# Patient Record
Sex: MALE | Race: WHITE | NOT HISPANIC OR LATINO | Employment: FULL TIME | URBAN - METROPOLITAN AREA
[De-identification: names, ages, dates, MRNs, and addresses within clinical notes are randomized per-mention and may not be internally consistent; named-entity substitution may affect disease eponyms.]

---

## 2017-06-08 DIAGNOSIS — Z20.828 EXPOSURE TO THE FLU: Primary | ICD-10-CM

## 2017-06-08 RX ORDER — OSELTAMIVIR PHOSPHATE 75 MG/1
75 CAPSULE ORAL DAILY
Qty: 10 CAPSULE | Refills: 0 | Status: SHIPPED | OUTPATIENT
Start: 2017-06-08 | End: 2018-11-02

## 2018-11-02 ENCOUNTER — OFFICE VISIT (OUTPATIENT)
Dept: INTERNAL MEDICINE | Facility: CLINIC | Age: 23
End: 2018-11-02

## 2018-11-02 ENCOUNTER — HOSPITAL ENCOUNTER (OUTPATIENT)
Dept: GENERAL RADIOLOGY | Facility: HOSPITAL | Age: 23
Discharge: HOME | End: 2018-11-02
Admitting: INTERNAL MEDICINE

## 2018-11-02 VITALS
OXYGEN SATURATION: 98 % | SYSTOLIC BLOOD PRESSURE: 122 MMHG | WEIGHT: 119.6 LBS | BODY MASS INDEX: 20.42 KG/M2 | RESPIRATION RATE: 22 BRPM | TEMPERATURE: 98.8 F | HEIGHT: 64 IN | DIASTOLIC BLOOD PRESSURE: 74 MMHG | HEART RATE: 112 BPM

## 2018-11-02 DIAGNOSIS — L08.9 RIGHT KNEE SKIN INFECTION: ICD-10-CM

## 2018-11-02 DIAGNOSIS — L20.9 ATOPIC DERMATITIS, UNSPECIFIED TYPE: ICD-10-CM

## 2018-11-02 DIAGNOSIS — J45.30 MILD PERSISTENT ASTHMA WITHOUT COMPLICATION: ICD-10-CM

## 2018-11-02 DIAGNOSIS — L08.9 RIGHT KNEE SKIN INFECTION: Primary | ICD-10-CM

## 2018-11-02 PROCEDURE — 99204 OFFICE O/P NEW MOD 45 MIN: CPT | Performed by: INTERNAL MEDICINE

## 2018-11-02 PROCEDURE — 90715 TDAP VACCINE 7 YRS/> IM: CPT | Performed by: INTERNAL MEDICINE

## 2018-11-02 PROCEDURE — 90471 IMMUNIZATION ADMIN: CPT | Performed by: INTERNAL MEDICINE

## 2018-11-02 PROCEDURE — 73560 X-RAY EXAM OF KNEE 1 OR 2: CPT

## 2018-11-02 RX ORDER — TACROLIMUS 1 MG/G
1 OINTMENT TOPICAL 2 TIMES WEEKLY
Qty: 30 G | Refills: 3 | Status: SHIPPED | OUTPATIENT
Start: 2018-11-05

## 2018-11-02 RX ORDER — TACROLIMUS 1 MG/G
1 OINTMENT TOPICAL 2 TIMES DAILY
COMMUNITY
End: 2018-11-02 | Stop reason: SDUPTHER

## 2018-11-02 RX ORDER — ALBUTEROL SULFATE 90 UG/1
2 AEROSOL, METERED RESPIRATORY (INHALATION) EVERY 4 HOURS PRN
Qty: 18 G | Refills: 3 | Status: SHIPPED | OUTPATIENT
Start: 2018-11-02

## 2018-11-02 RX ORDER — SULFAMETHOXAZOLE AND TRIMETHOPRIM 800; 160 MG/1; MG/1
1 TABLET ORAL EVERY 12 HOURS
Qty: 14 TABLET | Refills: 0 | Status: SHIPPED | OUTPATIENT
Start: 2018-11-02 | End: 2018-11-09

## 2018-11-02 NOTE — ASSESSMENT & PLAN NOTE
Stable, well controlled.  -Due to insurance change, Advair not on formulary and needs PA (and pt has not tried anything else)  -agreeable to trial of Dulera 100-5, 2 puffs BID  -also Rx for rescue inhaler (albuterol)  -call if needing frequent Albuterol or other sx

## 2018-11-02 NOTE — ASSESSMENT & PLAN NOTE
Suspect right anterior knee cellulitis following injury/abraison. Currently no focal areas of fluctuance to suggest abscess. Good joint ROM and lack of systemic sx or obvious knee effusion make septic joint less likely.  -obtain right knee xray  -start Bactrim DS 1 tab PO BID x 7d (given +MRSA hx); if not improving would also add strep coverage  -no purulent material expressed to send cx  -f/u next week to assess exam; if not improving may refer to ortho  -advised to call immediately for any systemic sx

## 2018-11-02 NOTE — PROGRESS NOTES
"OFFICE PROGRESS NOTE    Chief Complaint   Patient presents with   • Follow-up     5 day       HPI: 23 y.o. male with asthma, atopic derm who was last seen 11/2 for right knee cellulitis with normal knee xray and started on Bactrim DS 1 tab PO BID x 7d, here in f/u. Today reports:    No significant improvement, feels right knee is \"a little stiff\" at times but ROM intact. Was able to express some \"clear/yellow watery\" discharge from wound last night. Area still red/swollen. Still able to ambulate/bear weight. Denies systemic sx like F/C. Does have +MRSA hx and +strep hx.    Also reports that he forgot to mention at last visit that his previous PCP (Dr. Lugo) had started him on stimulant medication for ADD a few years ago. Was having trouble concentrating in last year of college and at work as a  (couldn't focus, difficult completing tasks). Initially on Adderall XR 20mg daily and then added 10mg of IR at noon when XR dose seemed to \"wear off.\" Currently on Adderall XR 30mg daily and IR 20mg at noon. Takes 7d/wk although might occasionally forget on weekends. Feels like he doesn't \"need\" meds anymore and also wants to come off these meds as it will be difficulty for him to obtain refills when he's in [facility] on [employer] mission.     Review of Systems   Constitutional: Negative for activity change, appetite change and fever.   HENT: Negative for congestion, sneezing and sore throat.    Eyes: Negative for discharge and visual disturbance.   Respiratory: Negative for cough and shortness of breath.    Cardiovascular: Negative for chest pain and palpitations.   Gastrointestinal: Negative for abdominal distention, abdominal pain, blood in stool, constipation, nausea and vomiting.   Endocrine: Negative for cold intolerance and heat intolerance.   Genitourinary: Negative for dysuria.   Musculoskeletal: Positive for arthralgias. Negative for neck stiffness.   Skin: Positive for color change. Negative for rash. "   Allergic/Immunologic: Negative for environmental allergies and food allergies.   Neurological: Negative for headache.   Hematological: Negative for adenopathy.   Psychiatric/Behavioral: Negative for depressed mood.       The following portions of the patient's history were reviewed and updated as appropriate: allergies, current medications, past family history, past medical history, past social history, past surgical history and problem list.      Physical Exam:  Vitals:    11/07/18 1343   BP: 122/80   Pulse: 120   Resp: 26   Temp: 100.2 °F (37.9 °C)   SpO2: 98%     Physical Exam   Constitutional: He is oriented to person, place, and time. He appears well-developed and well-nourished. No distress.   Cardiovascular: Normal rate.  Exam reveals no gallop and no friction rub.    No murmur heard.  Slightly tachycardic   Pulmonary/Chest: Effort normal. No respiratory distress. He has no wheezes. He has no rales. He exhibits no tenderness.   Abdominal: Soft. He exhibits no distension and no mass. There is no tenderness. There is no rebound and no guarding.   Musculoskeletal:        Right knee: He exhibits swelling and erythema. Effusion: mild. Tenderness found.   Neurological: He is alert and oriented to person, place, and time. He exhibits normal muscle tone.   Skin: Skin is warm and dry. Capillary refill takes less than 2 seconds. He is not diaphoretic.   Psychiatric: He has a normal mood and affect.   Vitals reviewed.    Imaging:  Xr Knee 1 Or 2 View Right  Result Date: 11/2/2018  No acute bony abnormality identified.  D:  11/02/2018 E:  11/02/2018  This report was finalized on 11/2/2018 3:57 PM by Dr. Norma Rivera MD.       Assesment and Plan: 23 y.o. male here in f/u of right knee pain/swelling.  Pain and swelling of knee, right  Not significantly improved on Bactrim (for MRSA coverage) and ?mild effusion on exam and continued warmth of anterior knee. ROM intact. Does have borderline temp today so raises  concern for possible joint space infection. Plain films done at last visit negative.  -check CBC, BMP, ESR/CRP  -add Keflex 500mg TID x 7d in addition to completing course of Bactrim  -refer to ortho for ?aspiration and/or need for MRI  -advised pt to seek medical care immediately for any F/C, acutely worsening pain/swelling, inability to ambulate etc    Attention deficit disorder  Discussed that if pt finds meds not helpful any longer/wishing to stop arthur in anticipation of upcoming [employer] mission, that seems reasonable. Consider tapering Adderall 30mg XR (i.e. q48h x 1 week, then q72h and stop). If pt feels he would like to continue this med longterm, advised that I do not feel comfortable managing adult onset ADD and would plan to refer to Psych to which pt is agreeable.       Return if symptoms worsen or fail to improve, for and after ortho visit.    Earnestine Davidson MD  11/7/2018

## 2018-11-02 NOTE — ASSESSMENT & PLAN NOTE
Trialed and failed several agents as discussed above and has had relatively good control of sx on Protopic 0.1% 2x/week. Has been following with PCP for this primarily.  -I will continue Protopic for now but pt may benefit from Derm eval as well  -PA needed; will have staff start

## 2018-11-05 ENCOUNTER — TELEPHONE (OUTPATIENT)
Dept: INTERNAL MEDICINE | Facility: CLINIC | Age: 23
End: 2018-11-05

## 2018-11-05 NOTE — TELEPHONE ENCOUNTER
PA has been submitted, Key #:  [id number] Maria Teresa is processing your PA request and will respond shortly with next steps. You are currently using the fastest method to process this prior authorization. To check for an update later, open this request again from your dashboard.  If you need assistance, please chat with CoverMyMeds or call us at [phone].

## 2018-11-07 ENCOUNTER — OFFICE VISIT (OUTPATIENT)
Dept: INTERNAL MEDICINE | Facility: CLINIC | Age: 23
End: 2018-11-07

## 2018-11-07 VITALS
OXYGEN SATURATION: 98 % | SYSTOLIC BLOOD PRESSURE: 122 MMHG | HEART RATE: 120 BPM | DIASTOLIC BLOOD PRESSURE: 80 MMHG | WEIGHT: 121 LBS | HEIGHT: 64 IN | TEMPERATURE: 100.2 F | RESPIRATION RATE: 26 BRPM | BODY MASS INDEX: 20.66 KG/M2

## 2018-11-07 DIAGNOSIS — F98.8 ATTENTION DEFICIT DISORDER, UNSPECIFIED HYPERACTIVITY PRESENCE: ICD-10-CM

## 2018-11-07 DIAGNOSIS — M25.561 PAIN AND SWELLING OF KNEE, RIGHT: Primary | ICD-10-CM

## 2018-11-07 DIAGNOSIS — M25.461 PAIN AND SWELLING OF KNEE, RIGHT: Primary | ICD-10-CM

## 2018-11-07 PROBLEM — L08.9 RIGHT KNEE SKIN INFECTION: Status: RESOLVED | Noted: 2018-11-02 | Resolved: 2018-11-07

## 2018-11-07 LAB
ANION GAP SERPL CALCULATED.3IONS-SCNC: 11 MMOL/L (ref 3–11)
BASOPHILS # BLD AUTO: 0.02 10*3/MM3 (ref 0–0.2)
BASOPHILS NFR BLD AUTO: 0.3 % (ref 0–1)
BUN BLD-MCNC: 16 MG/DL (ref 9–23)
BUN/CREAT SERPL: 12.2 (ref 7–25)
CALCIUM SPEC-SCNC: 9.8 MG/DL (ref 8.7–10.4)
CHLORIDE SERPL-SCNC: 99 MMOL/L (ref 99–109)
CO2 SERPL-SCNC: 26 MMOL/L (ref 20–31)
CREAT BLD-MCNC: 1.31 MG/DL (ref 0.6–1.3)
CRP SERPL-MCNC: 0.59 MG/DL (ref 0–1)
DEPRECATED RDW RBC AUTO: 42.9 FL (ref 37–54)
EOSINOPHIL # BLD AUTO: 0.17 10*3/MM3 (ref 0–0.3)
EOSINOPHIL NFR BLD AUTO: 2.3 % (ref 0–3)
ERYTHROCYTE [DISTWIDTH] IN BLOOD BY AUTOMATED COUNT: 13 % (ref 11.3–14.5)
ERYTHROCYTE [SEDIMENTATION RATE] IN BLOOD: 10 MM/HR (ref 0–15)
GFR SERPL CREATININE-BSD FRML MDRD: 68 ML/MIN/1.73
GLUCOSE BLD-MCNC: 87 MG/DL (ref 70–100)
HCT VFR BLD AUTO: 41.7 % (ref 38.9–50.9)
HGB BLD-MCNC: 13.6 G/DL (ref 13.1–17.5)
IMM GRANULOCYTES # BLD: 0.03 10*3/MM3 (ref 0–0.03)
IMM GRANULOCYTES NFR BLD: 0.4 % (ref 0–0.6)
LYMPHOCYTES # BLD AUTO: 1 10*3/MM3 (ref 0.6–4.8)
LYMPHOCYTES NFR BLD AUTO: 13.4 % (ref 24–44)
MCH RBC QN AUTO: 29.8 PG (ref 27–31)
MCHC RBC AUTO-ENTMCNC: 32.6 G/DL (ref 32–36)
MCV RBC AUTO: 91.2 FL (ref 80–99)
MONOCYTES # BLD AUTO: 1.08 10*3/MM3 (ref 0–1)
MONOCYTES NFR BLD AUTO: 14.5 % (ref 0–12)
NEUTROPHILS # BLD AUTO: 5.19 10*3/MM3 (ref 1.5–8.3)
NEUTROPHILS NFR BLD AUTO: 69.5 % (ref 41–71)
PLATELET # BLD AUTO: 273 10*3/MM3 (ref 150–450)
PMV BLD AUTO: 10.4 FL (ref 6–12)
POTASSIUM BLD-SCNC: 4.1 MMOL/L (ref 3.5–5.5)
RBC # BLD AUTO: 4.57 10*6/MM3 (ref 4.2–5.76)
SODIUM BLD-SCNC: 136 MMOL/L (ref 132–146)
WBC NRBC COR # BLD: 7.46 10*3/MM3 (ref 3.5–10.8)

## 2018-11-07 PROCEDURE — 99213 OFFICE O/P EST LOW 20 MIN: CPT | Performed by: INTERNAL MEDICINE

## 2018-11-07 PROCEDURE — 86140 C-REACTIVE PROTEIN: CPT | Performed by: INTERNAL MEDICINE

## 2018-11-07 PROCEDURE — 80048 BASIC METABOLIC PNL TOTAL CA: CPT | Performed by: INTERNAL MEDICINE

## 2018-11-07 PROCEDURE — 85652 RBC SED RATE AUTOMATED: CPT | Performed by: INTERNAL MEDICINE

## 2018-11-07 PROCEDURE — 36415 COLL VENOUS BLD VENIPUNCTURE: CPT | Performed by: INTERNAL MEDICINE

## 2018-11-07 PROCEDURE — 85025 COMPLETE CBC W/AUTO DIFF WBC: CPT | Performed by: INTERNAL MEDICINE

## 2018-11-07 RX ORDER — DEXTROAMPHETAMINE SACCHARATE, AMPHETAMINE ASPARTATE MONOHYDRATE, DEXTROAMPHETAMINE SULFATE AND AMPHETAMINE SULFATE 7.5; 7.5; 7.5; 7.5 MG/1; MG/1; MG/1; MG/1
1 CAPSULE, EXTENDED RELEASE ORAL ONCE
COMMUNITY
Start: 2018-11-06

## 2018-11-07 RX ORDER — CEPHALEXIN 500 MG/1
500 CAPSULE ORAL 3 TIMES DAILY
Qty: 21 CAPSULE | Refills: 0 | Status: SHIPPED | OUTPATIENT
Start: 2018-11-07 | End: 2018-11-14

## 2018-11-07 NOTE — ASSESSMENT & PLAN NOTE
Not significantly improved on Bactrim (for MRSA coverage) and ?mild effusion on exam and continued warmth of anterior knee. ROM intact. Does have borderline temp today so raises concern for possible joint space infection. Plain films done at last visit negative.  -check CBC, BMP, ESR/CRP  -add Keflex 500mg TID x 7d in addition to completing course of Bactrim  -refer to ortho for ?aspiration and/or need for MRI  -advised pt to seek medical care immediately for any F/C, acutely worsening pain/swelling, inability to ambulate etc

## 2018-11-07 NOTE — ASSESSMENT & PLAN NOTE
Discussed that if pt finds meds not helpful any longer/wishing to stop arthur in anticipation of upcoming [employer] mission, that seems reasonable. Consider tapering Adderall 30mg XR (i.e. q48h x 1 week, then q72h and stop). If pt feels he would like to continue this med longterm, advised that I do not feel comfortable managing adult onset ADD and would plan to refer to Psych to which pt is agreeable.

## 2018-11-08 ENCOUNTER — TELEPHONE (OUTPATIENT)
Dept: INTERNAL MEDICINE | Facility: CLINIC | Age: 23
End: 2018-11-08

## 2018-11-08 NOTE — TELEPHONE ENCOUNTER
Please let pt know that his blood counts, inflammatory markers and electrolytes/kidney function are all normal (his Creatinine techinically reads at the high end of normal but that is a side effect of the Bactrim he is taking and not an indication of decreased kidney function).    I'll follow up with him after he sees ortho tomorrow.    Thanks.

## 2018-11-09 ENCOUNTER — OFFICE VISIT (OUTPATIENT)
Dept: ORTHOPEDIC SURGERY | Facility: CLINIC | Age: 23
End: 2018-11-09

## 2018-11-09 VITALS — BODY MASS INDEX: 20.01 KG/M2 | WEIGHT: 117.2 LBS | OXYGEN SATURATION: 98 % | HEART RATE: 86 BPM | HEIGHT: 64 IN
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
## 2018-11-09 DIAGNOSIS — L03.115 CELLULITIS OF KNEE, RIGHT: ICD-10-CM

## 2018-11-09 DIAGNOSIS — S81.001A OPEN WOUND OF RIGHT KNEE, INITIAL ENCOUNTER: Primary | ICD-10-CM

## 2018-11-09 PROCEDURE — 99203 OFFICE O/P NEW LOW 30 MIN: CPT | Performed by: PHYSICIAN ASSISTANT

## 2018-11-09 NOTE — PROGRESS NOTES
[facility]    Subjective     Chief Complaint   Patient presents with   • Right Knee - Pain        HPI      Darnell Garnett is a 23 y.o. male.  Patient referred to orthopedic clinic for evaluation of his right knee.  He states that on 10/13/18 he was hunting in [place] when he fell on a rock and sustained small cut to his right knee.  After that he developed erythema, swelling and pain to the anterior aspect of his knee.  About 10 days later he noted purulent drainage from the knee wound along with the persistent swelling, erythema and pain.  His PCP started him on Bactrim.  During this time he denies any fever, chills, night sweats or other constitutional symptoms.  He had an additional follow-up with his PCP on 11/7/18 and because there was no significant improvement, he was started on Keflex.  Since beginning the Keflex, his symptoms have slowly resolved.  He no longer has pain, erythema, swelling or drainage.  Currently he reports pain scale maximum 0/10.  No reported radiculopathy or paresthesias.  States he did have a history positive MRSA in high school.  He continues to deny any some fevers, chills, night sweats or other constitutional symptoms.      Past Medical History:   Diagnosis Date   • Allergic rhinitis    • Asthma    • Atopic dermatitis       Past Surgical History:   Procedure Laterality Date   • TONSILLECTOMY        Family History   Problem Relation Age of Onset   • Asthma Father      Social History     Social History   • Marital status: Single     Spouse name: N/A   • Number of children: N/A   • Years of education: N/A     Occupational History   • Not on file.     Social History Main Topics   • Smoking status: Never Smoker   • Smokeless tobacco: Never Used   • Alcohol use Yes      Comment: occasional   • Drug use: No   • Sexual activity: Defer     Other Topics Concern   • Not on file     Social History Narrative    Planning to join [employer] ([facility]) starting Jan  "2019. Reports he is UTD on vaccines per their RN and already has Malaria ppx to take with him.      Current Outpatient Prescriptions on File Prior to Visit   Medication Sig Dispense Refill   • albuterol (PROVENTIL HFA;VENTOLIN HFA) 108 (90 Base) MCG/ACT inhaler Inhale 2 puffs Every 4 (Four) Hours As Needed for Wheezing or Shortness of Air. 18 g 3   • amphetamine-dextroamphetamine XR (ADDERALL XR) 30 MG 24 hr capsule Take 1 capsule by mouth 1 (One) Time     • cephalexin (KEFLEX) 500 MG capsule Take 1 capsule by mouth 3 (Three) Times a Day for 7 days. 21 capsule 0   • mometasone-formoterol (DULERA) 100-5 MCG/ACT inhaler Inhale 1 puff 2 (Two) Times a Day. 13 g 6   • sulfamethoxazole-trimethoprim (BACTRIM DS,SEPTRA DS) 800-160 MG per tablet Take 1 tablet by mouth Every 12 (Twelve) Hours for 7 days. 14 tablet 0   • tacrolimus (PROTOPIC) 0.1 % ointment Apply 1 application topically to the appropriate area as directed 2 (Two) Times a Week. 30 g 3     No current facility-administered medications on file prior to visit.       No Known Allergies     The following portions of the patient's history were reviewed and updated as appropriate: allergies, current medications, past family history, past medical history, past social history, past surgical history and problem list.    Review of Systems   Constitutional: Positive for activity change.   Musculoskeletal: Positive for arthralgias (right knee) and joint swelling.   Skin: Positive for color change.        Objective      Physical Exam  Pulse 86   Ht 163.2 cm (64.25\")   Wt 53.2 kg (117 lb 3.2 oz)   SpO2 98%   BMI 19.96 kg/m²     Body mass index is 19.96 kg/m².        GENERAL APPEARANCE: awake, alert & oriented x 3, in no acute distress and well developed, well nourished  PSYCH: normal mood and affect  LUNGS:  breathing nonlabored, no wheezing  EYES: sclera anicteric, pupils equal  CARDIOVASCULAR: palpable pulses dorsalis pedis, palpable posterior tibial bilaterally. " Capillary refill less than 2 seconds  INTEGUMENTARY: skin intact, no clubbing, cyanosis  NEUROLOGIC:  Normal Sensation and reflexes             Ortho Exam  Peripheral Vascular:    Upper Extremity:   Inspection:  Left--no cyanotic nail beds Right--no cyanotic nail beds   Bilateral:  Pink nail beds with brisk capillary refill   Palpation:  Bilateral radial pulse normal    Musculoskeletal:  Global Assessment:  Overall assessment of Lower Extremity Muscle Strength and Tone:    Right quadriceps--5/5  Right hamstrings--5/5  Right tibialis anterior--5/5  Right gastroc soleus--5/5  Right EHL--5/5    Lower Extremity:  Knee/Patella:  No digital clubbing or cyanosis.    Examination of right knee reveals:  Normal deep tendon reflexes, coordination, strength, tone, sensation.  No known fractures or deformities.    Inspection and Palpation:  Healing wound anterio-lateral knee. No erythema, fluctuance, induration or drainage today.    Right knee:  Tenderness:  none  Effusion:  none  Crepitus:  none  Pulses:  2+  Ecchymosis:  None  Warmth:  None     ROM:  Right:  Extension:0    Flexion:135  Left:  Extension:0     Flexion:135    Instability:      Right:  Lachman Test:  Negative, Varus stress test negative, Valgus stress test negative   Anterior Drawer Test:  Negative, Posterior Drawer Test:  Negative    Deformities/Malalignments/Discrepancies:    Left:  none  Right:  none    Functional Testing:  Right:  Belia's test:  Negative  Patella grind test:  Negative  Q-angle:  Normal  Apprehension Sign:  Negative    Weight-bearing/Axial load negative    Imaging/Studies  Imaging Results (last 7 days)     ** No results found for the last 168 hours. **      Reviewed plain film imaging performed on 11/2/18 which showed no acute bony abnormality, fracture or dislocation.  See chart for official report.    Reviewed CBC (WBC 7.46), ESR (10) and CRP (0.59) performed on 11/07/18.  See chart for official report.    Assessment/Plan        ICD-10-CM  ICD-9-CM   1. Open wound of right knee, initial encounter S81.001A 891.0   2. Cellulitis of knee, right L03.115 682.6   3. Injury while hunting Y93.89 E029.9       No orders of the defined types were placed in this encounter.       -Discussion was had with the patient regarding exam, imaging, lab results, assessment and treatment options.  -Patient is healing well at this time.  -Continue antibiotics until finished.  -Reviewed with him signs and symptoms of infection with return precautions.  -At this time will follow-up as needed.  -Questions and concerns answered.    Medical Decision Making  Management Options : prescription/IM medicine  Data/Risk: lab tests and radiology tests    Vidhya Pena PA-C  11/09/18  2:15 PM         EMR Dragon/Transcription disclaimer:  Much of this encounter note is an electronic transcription of spoken language to printed text. Electronic transcription of spoken language may permit erroneous, or at times, nonsensical words or phrases to be inadvertently transcribed. Although I have reviewed the note for such errors, some may still exist.

## 2019-04-29 ENCOUNTER — OFFICE VISIT (OUTPATIENT)
Dept: INTERNAL MEDICINE | Facility: CLINIC | Age: 24
End: 2019-04-29

## 2019-04-29 VITALS
WEIGHT: 125 LBS | BODY MASS INDEX: 21.29 KG/M2 | RESPIRATION RATE: 16 BRPM | TEMPERATURE: 99 F | OXYGEN SATURATION: 99 % | HEART RATE: 109 BPM | DIASTOLIC BLOOD PRESSURE: 78 MMHG | SYSTOLIC BLOOD PRESSURE: 110 MMHG

## 2019-04-29 DIAGNOSIS — K62.5 RECTAL BLEEDING: Primary | ICD-10-CM

## 2019-04-29 DIAGNOSIS — K64.4 EXTERNAL HEMORRHOID, BLEEDING: ICD-10-CM

## 2019-04-29 PROBLEM — M25.561 PAIN AND SWELLING OF KNEE, RIGHT: Status: RESOLVED | Noted: 2018-11-07 | Resolved: 2019-04-29

## 2019-04-29 PROBLEM — M25.461 PAIN AND SWELLING OF KNEE, RIGHT: Status: RESOLVED | Noted: 2018-11-07 | Resolved: 2019-04-29

## 2019-04-29 LAB
ANION GAP SERPL CALCULATED.3IONS-SCNC: 13.6 MMOL/L
BUN BLD-MCNC: 21 MG/DL (ref 6–20)
BUN/CREAT SERPL: 19.8 (ref 7–25)
CALCIUM SPEC-SCNC: 9.9 MG/DL (ref 8.6–10.5)
CHLORIDE SERPL-SCNC: 99 MMOL/L (ref 98–107)
CO2 SERPL-SCNC: 27.4 MMOL/L (ref 22–29)
CREAT BLD-MCNC: 1.06 MG/DL (ref 0.76–1.27)
CRP SERPL-MCNC: 0.48 MG/DL (ref 0–0.5)
DEPRECATED RDW RBC AUTO: 41.4 FL (ref 37–54)
ERYTHROCYTE [DISTWIDTH] IN BLOOD BY AUTOMATED COUNT: 11.9 % (ref 12.3–15.4)
GFR SERPL CREATININE-BSD FRML MDRD: 86 ML/MIN/1.73
GLUCOSE BLD-MCNC: 108 MG/DL (ref 65–99)
HCT VFR BLD AUTO: 44.6 % (ref 37.5–51)
HGB BLD-MCNC: 14.3 G/DL (ref 13–17.7)
MCH RBC QN AUTO: 30.2 PG (ref 26.6–33)
MCHC RBC AUTO-ENTMCNC: 32.1 G/DL (ref 31.5–35.7)
MCV RBC AUTO: 94.3 FL (ref 79–97)
PLATELET # BLD AUTO: 254 10*3/MM3 (ref 140–450)
PMV BLD AUTO: 10.7 FL (ref 6–12)
POTASSIUM BLD-SCNC: 3.9 MMOL/L (ref 3.5–5.2)
RBC # BLD AUTO: 4.73 10*6/MM3 (ref 4.14–5.8)
SODIUM BLD-SCNC: 140 MMOL/L (ref 136–145)
WBC NRBC COR # BLD: 10.16 10*3/MM3 (ref 3.4–10.8)

## 2019-04-29 PROCEDURE — 86140 C-REACTIVE PROTEIN: CPT | Performed by: INTERNAL MEDICINE

## 2019-04-29 PROCEDURE — 85027 COMPLETE CBC AUTOMATED: CPT | Performed by: INTERNAL MEDICINE

## 2019-04-29 PROCEDURE — 36415 COLL VENOUS BLD VENIPUNCTURE: CPT | Performed by: INTERNAL MEDICINE

## 2019-04-29 PROCEDURE — 80048 BASIC METABOLIC PNL TOTAL CA: CPT | Performed by: INTERNAL MEDICINE

## 2019-04-29 PROCEDURE — 99214 OFFICE O/P EST MOD 30 MIN: CPT | Performed by: INTERNAL MEDICINE

## 2019-04-29 PROCEDURE — 85652 RBC SED RATE AUTOMATED: CPT | Performed by: INTERNAL MEDICINE

## 2019-04-29 RX ORDER — POLYETHYLENE GLYCOL 3350 17 G/17G
17 POWDER, FOR SOLUTION ORAL DAILY
Qty: 850 G | Refills: 1 | Status: SHIPPED | OUTPATIENT
Start: 2019-04-29

## 2019-04-29 NOTE — ASSESSMENT & PLAN NOTE
Painless rectal bleeding since yesterday after bowel movement.  Given exam with large external hemorrhoid, likely irritation induced hemorrhoidal bleeding.  Most likely secondary to suboptimally controlled constipation given patient reports of having a stool every 48 hours although he denies hard stools.  Given volume of reported bleeding we will go ahead and check CBC along with BMP, ESR/CRP (if elevated inflammatory markers possibly higher suspicion for autoimmune pathology).  Unlikely infectious in the absence of fever/chills, abdominal pain, diarrhea etc.  Recommend MiraLAX 1 cap daily and titrate for soft formed stool daily.  Also given prescription for Anusol HC to use MA x7 to 10 days to help possibly shrink the hemorrhoids.  Given size, may need surgical intervention so we will go ahead and refer to colorectal surgery.  Follow-up with patient pending labs.  Call sooner if he has any new questions/concerns like abdominal pain.

## 2019-04-29 NOTE — PROGRESS NOTES
OFFICE PROGRESS NOTE    Chief Complaint   Patient presents with   • Rectal Bleeding     bright colored blood in stool, after restroom continued bleeding for about 5 minutes.        HPI: 24 y.o. male with hx asthma, atopic derm here for:    Yesterday after having a bowel movement (which he did not report straining for), had rectal bleeding.  Initially was just on the toilet paper and then jelani bleeding from his rectum itself.  Have to hold the toilet paper in the area with pressure to get this to stop.  Eventually did so after 5 minutes.  Since then has had intermittent spotting in his underwear, last around 1030.  Denies any rectal pain, abdominal pain, nausea/vomiting, fever/chills, nighttime awakenings, weight loss.  No known sick contacts.  Reports constipation as a child, although denies this recently.  Does report he has 1 bowel movement every 48 hours or so at baseline.  No dietary changes.  Denies any significant dizziness.  Denies NSAID use.  Will drink a maximum of 6 beers a week, although not every week.  Denies significant caffeine intake.  No family history of IBD/celiac disease etc.  This has never happened to him before.    Review of Systems   Constitutional: Negative for activity change, appetite change and fever.   HENT: Negative for congestion, sneezing and sore throat.    Eyes: Negative for discharge and visual disturbance.   Respiratory: Negative for cough and shortness of breath.    Cardiovascular: Negative for chest pain and palpitations.   Gastrointestinal: Positive for blood in stool. Negative for abdominal distention, abdominal pain, constipation, nausea and vomiting.   Endocrine: Negative for cold intolerance and heat intolerance.   Genitourinary: Negative for dysuria.   Musculoskeletal: Negative for neck stiffness.   Skin: Negative for rash.   Allergic/Immunologic: Negative for environmental allergies and food allergies.   Neurological: Negative for headache.   Hematological: Negative for  adenopathy.   Psychiatric/Behavioral: Negative for depressed mood.       The following portions of the patient's history were reviewed and updated as appropriate: allergies, current medications, past family history, past medical history, past social history, past surgical history and problem list.      Physical Exam:  Vitals:    04/29/19 1542   BP: 110/78   Pulse: 109   Resp: 16   Temp: 99 °F (37.2 °C)   TempSrc: Temporal   SpO2: 99%   Weight: 56.7 kg (125 lb)       Physical Exam   Constitutional: He is oriented to person, place, and time. He appears well-developed and well-nourished. No distress.   HENT:   Head: Normocephalic and atraumatic.   Right Ear: External ear normal.   Left Ear: External ear normal.   Nose: Nose normal.   Eyes: Conjunctivae are normal. Right eye exhibits no discharge. Left eye exhibits no discharge.   Neck: Normal range of motion. Neck supple. No thyromegaly present.   Cardiovascular: Normal rate, regular rhythm and normal heart sounds. Exam reveals no gallop and no friction rub.   No murmur heard.  Pulmonary/Chest: Effort normal and breath sounds normal. No stridor. No respiratory distress. He has no wheezes. He has no rales.   Abdominal: Soft. Bowel sounds are normal. He exhibits no distension and no mass. There is no tenderness. There is no rebound and no guarding.   Genitourinary: Prostate normal. Rectal exam shows external hemorrhoid (Large approximately 1 cm external nonthrombosed hemorrhoid) and guaiac positive stool. Rectal exam shows no internal hemorrhoid, no fissure, no tenderness and anal tone normal.   Lymphadenopathy:     He has no cervical adenopathy.   Neurological: He is alert and oriented to person, place, and time. He exhibits normal muscle tone.   Skin: Skin is warm and dry. Capillary refill takes less than 2 seconds. He is not diaphoretic. No pallor.   Psychiatric: He has a normal mood and affect.   Vitals reviewed.       Assesment and Plan: 24 y.o. male here  for:  Rectal bleeding  Painless rectal bleeding since yesterday after bowel movement.  Given exam with large external hemorrhoid, likely irritation induced hemorrhoidal bleeding.  Most likely secondary to suboptimally controlled constipation given patient reports of having a stool every 48 hours although he denies hard stools.  Given volume of reported bleeding we will go ahead and check CBC along with BMP, ESR/CRP (if elevated inflammatory markers possibly higher suspicion for autoimmune pathology).  Unlikely infectious in the absence of fever/chills, abdominal pain, diarrhea etc.  Recommend MiraLAX 1 cap daily and titrate for soft formed stool daily.  Also given prescription for Anusol HC to use OH x7 to 10 days to help possibly shrink the hemorrhoids.  Given size, may need surgical intervention so we will go ahead and refer to colorectal surgery.  Follow-up with patient pending labs.  Call sooner if he has any new questions/concerns like abdominal pain.      Return in about 2 weeks (around 5/13/2019) for Follow-up.    Earnestine Davidson MD  4/29/2019

## 2019-04-30 LAB — ERYTHROCYTE [SEDIMENTATION RATE] IN BLOOD: 5 MM/HR (ref 0–15)

## 2020-07-27 PROCEDURE — U0003 INFECTIOUS AGENT DETECTION BY NUCLEIC ACID (DNA OR RNA); SEVERE ACUTE RESPIRATORY SYNDROME CORONAVIRUS 2 (SARS-COV-2) (CORONAVIRUS DISEASE [COVID-19]), AMPLIFIED PROBE TECHNIQUE, MAKING USE OF HIGH THROUGHPUT TECHNOLOGIES AS DESCRIBED BY CMS-2020-01-R: HCPCS | Performed by: NURSE PRACTITIONER

## 2020-07-29 ENCOUNTER — TELEPHONE (OUTPATIENT)
Dept: URGENT CARE | Facility: CLINIC | Age: 25
End: 2020-07-29

## 2020-07-29 NOTE — TELEPHONE ENCOUNTER
Reviewed with Alie RUIZ. Informed Pt lab result was not detected. Pt verbalized understanding stated he was not symptomatic only exposed no further questions

## 2021-03-02 ENCOUNTER — IMMUNIZATION (OUTPATIENT)
Dept: VACCINE CLINIC | Facility: HOSPITAL | Age: 26
End: 2021-03-02

## 2021-03-02 PROCEDURE — 0001A: CPT | Performed by: INTERNAL MEDICINE

## 2021-03-02 PROCEDURE — 91300 HC SARSCOV02 VAC 30MCG/0.3ML IM: CPT | Performed by: INTERNAL MEDICINE

## 2021-03-04 ENCOUNTER — HOSPITAL ENCOUNTER (EMERGENCY)
Facility: HOSPITAL | Age: 26
Discharge: HOME | End: 2021-03-04
Attending: EMERGENCY MEDICINE | Admitting: EMERGENCY MEDICINE

## 2021-03-04 VITALS
WEIGHT: 130 LBS | RESPIRATION RATE: 16 BRPM | HEART RATE: 85 BPM | TEMPERATURE: 97.7 F | SYSTOLIC BLOOD PRESSURE: 116 MMHG | BODY MASS INDEX: 20.89 KG/M2 | HEIGHT: 66 IN | DIASTOLIC BLOOD PRESSURE: 84 MMHG | OXYGEN SATURATION: 99 %

## 2021-03-04 DIAGNOSIS — T78.2XXA ANAPHYLAXIS, INITIAL ENCOUNTER: Primary | ICD-10-CM

## 2021-03-04 PROCEDURE — 25010000002 DIPHENHYDRAMINE PER 50 MG: Performed by: EMERGENCY MEDICINE

## 2021-03-04 PROCEDURE — 25010000002 EPINEPHRINE (ANAPHYLAXIS) 1 MG/ML SOLUTION: Performed by: EMERGENCY MEDICINE

## 2021-03-04 PROCEDURE — 96375 TX/PRO/DX INJ NEW DRUG ADDON: CPT

## 2021-03-04 PROCEDURE — 99283 EMERGENCY DEPT VISIT LOW MDM: CPT

## 2021-03-04 PROCEDURE — 96372 THER/PROPH/DIAG INJ SC/IM: CPT

## 2021-03-04 PROCEDURE — 25010000002 DEXAMETHASONE SODIUM PHOSPHATE 100 MG/10ML SOLUTION: Performed by: EMERGENCY MEDICINE

## 2021-03-04 PROCEDURE — 96365 THER/PROPH/DIAG IV INF INIT: CPT

## 2021-03-04 RX ORDER — FAMOTIDINE 10 MG/ML
20 INJECTION, SOLUTION INTRAVENOUS ONCE
Status: COMPLETED | OUTPATIENT
Start: 2021-03-04 | End: 2021-03-04

## 2021-03-04 RX ORDER — DIPHENHYDRAMINE HYDROCHLORIDE 50 MG/ML
50 INJECTION INTRAMUSCULAR; INTRAVENOUS ONCE
Status: COMPLETED | OUTPATIENT
Start: 2021-03-04 | End: 2021-03-04

## 2021-03-04 RX ORDER — EPINEPHRINE 0.3 MG/.3ML
0.3 INJECTION SUBCUTANEOUS ONCE
Qty: 1 EACH | Refills: 0 | Status: SHIPPED | OUTPATIENT
Start: 2021-03-04 | End: 2021-03-04

## 2021-03-04 RX ORDER — EPINEPHRINE 1 MG/ML
0.3 INJECTION, SOLUTION INTRAMUSCULAR; SUBCUTANEOUS ONCE
Status: COMPLETED | OUTPATIENT
Start: 2021-03-04 | End: 2021-03-04

## 2021-03-04 RX ADMIN — EPINEPHRINE 0.3 MG: 1 INJECTION INTRAMUSCULAR; INTRAVENOUS; SUBCUTANEOUS at 11:39

## 2021-03-04 RX ADMIN — FAMOTIDINE 20 MG: 10 INJECTION, SOLUTION INTRAVENOUS at 11:38

## 2021-03-04 RX ADMIN — DIPHENHYDRAMINE HYDROCHLORIDE 50 MG: 50 INJECTION INTRAMUSCULAR; INTRAVENOUS at 11:38

## 2021-03-04 RX ADMIN — DEXAMETHASONE SODIUM PHOSPHATE 10 MG: 10 INJECTION, SOLUTION INTRAMUSCULAR; INTRAVENOUS at 11:49

## 2021-03-05 NOTE — ED PROVIDER NOTES
Subjective   Pt presents with anaphylactic reaction.  He was at work and had rapid onset of lip swelling and hives to the face, scalp and neck.  Some fatigue/weakness as well.  He has a history of reactions and normally carries an Epipen. Unfortunately he stores it in his truck and his truck is in the shop right now.  He drove to the ED and sx worsened en route but have abated some since arrival.  He didn't take anything for symptoms.    He denies voice change, difficulty swallowing or dyspnea.    The only new exposure is the Pfizer covid vaccine two days ago.  He has had some mild fatigue since that but otherwise had felt fine.  Today he had a protein shake which is typical for him.      History provided by:  Patient      Review of Systems   HENT: Positive for facial swelling. Negative for trouble swallowing and voice change.    Respiratory: Negative for shortness of breath.    Gastrointestinal: Negative for vomiting.   Skin: Positive for rash.   All other systems reviewed and are negative.      Past Medical History:   Diagnosis Date   • Allergic rhinitis    • Asthma    • Atopic dermatitis    • Eczema        No Known Allergies    Past Surgical History:   Procedure Laterality Date   • TONSILLECTOMY         Family History   Problem Relation Age of Onset   • Asthma Father        Social History     Socioeconomic History   • Marital status: Single     Spouse name: Not on file   • Number of children: Not on file   • Years of education: Not on file   • Highest education level: Not on file   Tobacco Use   • Smoking status: Never Smoker   • Smokeless tobacco: Never Used   Substance and Sexual Activity   • Alcohol use: Yes     Comment: occasional   • Drug use: No   • Sexual activity: Defer   Social History Narrative    Planning to join [employer] ([employer], later [employer]) starting Jan 2019. Reports he is UTD on vaccines per their RN and already has Malaria ppx to take with him.           Objective   Physical Exam  Vitals signs and nursing  note reviewed.   Constitutional:       General: He is not in acute distress.     Appearance: Normal appearance. He is not ill-appearing.   HENT:      Head: Normocephalic and atraumatic.      Mouth/Throat:      Mouth: Mucous membranes are moist.      Pharynx: Oropharynx is clear.      Comments: No pharyngeal swelling.  Voice normal.  Eyes:      General: No scleral icterus.        Right eye: No discharge.         Left eye: No discharge.      Conjunctiva/sclera: Conjunctivae normal.   Neck:      Musculoskeletal: Normal range of motion and neck supple.   Cardiovascular:      Rate and Rhythm: Normal rate and regular rhythm.      Heart sounds: No murmur.   Pulmonary:      Effort: Pulmonary effort is normal. No respiratory distress.      Breath sounds: Normal breath sounds. No wheezing.   Abdominal:      General: Bowel sounds are normal. There is no distension.      Palpations: Abdomen is soft.      Tenderness: There is no abdominal tenderness. There is no guarding or rebound.   Musculoskeletal: Normal range of motion.         General: No swelling.   Skin:     General: Skin is warm and dry.      Findings: Rash present.      Comments: Urticaria to the face, scalp, neck, trunk and arms.   Neurological:      General: No focal deficit present.      Mental Status: He is alert. Mental status is at baseline.   Psychiatric:         Mood and Affect: Mood normal.         Behavior: Behavior normal.         Thought Content: Thought content normal.         Procedures           ED Course         Epi IM given, with H1/H2 blockers and dexamethasone.  Rapid improvement and pt feels back to baseline.  Observed for a period.    Unclear what trigger is. He has eczema, asthma so clearly has underlying atopy and it may not take much to set off such reactions.  He has had several of them.  For the time being I advise him not to get second covid vaccine.  First dose efficacy of Pfizer vaccine is over 80% in recent studies so hopefully he is well  covered.    Patient stable on serial rechecks.  Discussed findings, concerns, plan of care, expected course, reasons to return and followup.  Provided the opportunity to ask questions.                                    MDM    Final diagnoses:   Anaphylaxis, initial encounter            Graeme Sandy MD  03/04/21 2023

## 2021-03-23 ENCOUNTER — APPOINTMENT (OUTPATIENT)
Dept: VACCINE CLINIC | Facility: HOSPITAL | Age: 26
End: 2021-03-23

## 2022-06-21 ENCOUNTER — APPOINTMENT (OUTPATIENT)
Dept: GENERAL RADIOLOGY | Facility: HOSPITAL | Age: 27
End: 2022-06-21

## 2022-06-21 ENCOUNTER — HOSPITAL ENCOUNTER (EMERGENCY)
Facility: HOSPITAL | Age: 27
Discharge: HOME | End: 2022-06-21
Attending: EMERGENCY MEDICINE | Admitting: EMERGENCY MEDICINE

## 2022-06-21 VITALS
TEMPERATURE: 97.5 F | OXYGEN SATURATION: 94 % | DIASTOLIC BLOOD PRESSURE: 85 MMHG | WEIGHT: 135 LBS | HEIGHT: 66 IN | SYSTOLIC BLOOD PRESSURE: 128 MMHG | BODY MASS INDEX: 21.69 KG/M2 | HEART RATE: 90 BPM | RESPIRATION RATE: 20 BRPM

## 2022-06-21 DIAGNOSIS — Z87.09 HISTORY OF ASTHMA: ICD-10-CM

## 2022-06-21 DIAGNOSIS — R26.2 IMPAIRED AMBULATION: ICD-10-CM

## 2022-06-21 DIAGNOSIS — S82.852A CLOSED TRIMALLEOLAR FRACTURE OF LEFT ANKLE, INITIAL ENCOUNTER: Primary | ICD-10-CM

## 2022-06-21 PROCEDURE — 63710000001 ONDANSETRON ODT 4 MG TABLET DISPERSIBLE: Performed by: EMERGENCY MEDICINE

## 2022-06-21 PROCEDURE — 99283 EMERGENCY DEPT VISIT LOW MDM: CPT

## 2022-06-21 PROCEDURE — 96372 THER/PROPH/DIAG INJ SC/IM: CPT

## 2022-06-21 PROCEDURE — 73630 X-RAY EXAM OF FOOT: CPT

## 2022-06-21 PROCEDURE — 25010000002 HYDROMORPHONE 1 MG/ML SOLUTION: Performed by: EMERGENCY MEDICINE

## 2022-06-21 PROCEDURE — 73610 X-RAY EXAM OF ANKLE: CPT

## 2022-06-21 RX ORDER — OXYCODONE HYDROCHLORIDE AND ACETAMINOPHEN 5; 325 MG/1; MG/1
1 TABLET ORAL ONCE
Status: COMPLETED | OUTPATIENT
Start: 2022-06-21 | End: 2022-06-21

## 2022-06-21 RX ORDER — OXYCODONE HYDROCHLORIDE AND ACETAMINOPHEN 5; 325 MG/1; MG/1
1 TABLET ORAL EVERY 4 HOURS PRN
Qty: 12 TABLET | Refills: 0 | Status: SHIPPED | OUTPATIENT
Start: 2022-06-21

## 2022-06-21 RX ORDER — ONDANSETRON 4 MG/1
4 TABLET, ORALLY DISINTEGRATING ORAL EVERY 4 HOURS
Qty: 12 TABLET | Refills: 0 | Status: SHIPPED | OUTPATIENT
Start: 2022-06-21

## 2022-06-21 RX ORDER — ONDANSETRON 4 MG/1
4 TABLET, ORALLY DISINTEGRATING ORAL ONCE
Status: COMPLETED | OUTPATIENT
Start: 2022-06-21 | End: 2022-06-21

## 2022-06-21 RX ADMIN — OXYCODONE HYDROCHLORIDE AND ACETAMINOPHEN 1 TABLET: 5; 325 TABLET ORAL at 21:22

## 2022-06-21 RX ADMIN — ONDANSETRON 4 MG: 4 TABLET, ORALLY DISINTEGRATING ORAL at 21:22

## 2022-06-21 RX ADMIN — HYDROMORPHONE HYDROCHLORIDE 1 MG: 1 INJECTION, SOLUTION INTRAMUSCULAR; INTRAVENOUS; SUBCUTANEOUS at 21:57

## 2022-06-22 ENCOUNTER — CLINICAL SUPPORT NO REQUIREMENTS (OUTPATIENT)
Dept: PREADMISSION TESTING | Facility: HOSPITAL | Age: 27
End: 2022-06-22

## 2022-06-22 LAB — SARS-COV-2 RNA PNL SPEC NAA+PROBE: NOT DETECTED

## 2022-06-22 PROCEDURE — C9803 HOPD COVID-19 SPEC COLLECT: HCPCS

## 2022-06-22 PROCEDURE — U0004 COV-19 TEST NON-CDC HGH THRU: HCPCS

## 2022-06-22 NOTE — DISCHARGE INSTRUCTIONS
Patient had evidence of trimalleolar fracture of the left ankle.  Patient is neurovascularly intact.  We applied a splint for stabilization and recommend no weightbearing.  Contact orthopedist, Dr. Porter, first thing in the morning for recheck, more than likely tomorrow.  Recommend elevation and ice as needed for swelling.  Rx for Percocet 5 mg by mouth every 4-6 hours as needed for moderate pain dispense 12 no refills and Zofran 4 mg ODT every 4-6 hours as needed for nausea/vomiting.  Return to the ER if any worsening symptoms.

## 2022-06-22 NOTE — ED PROVIDER NOTES
"Subjective   This is a 27-year-old male that presents the ER with left ankle injury that occurred 1 hour prior to arrival while playing kickball.  Patient says that he was trying to slide into the base and he says that his left ankle bent laterally and he heard a loud \"pop\".  Patient says that ankle appeared dislocated and he took both hands and reduced it himself.  He has been having severe pain to lateral and medial aspect of the left ankle with swelling and unable to bear any weight.  He reports some numbness and tingling to the toes on the left foot.  He denies any previous left ankle fracture or injury.  Patient did not take anything prior to arrival.  He knew pain was severe and came straight to the ER for assessment.  Patient denies any other known injuries.  Past medical history significant for allergic rhinitis, atopic dermatitis, and asthma.      History provided by:  Patient  Ankle Pain  Location:  Ankle  Time since incident:  30 minutes  Injury: yes    Ankle location:  L ankle  Pain details:     Quality:  Throbbing and shooting    Radiates to:  Does not radiate    Onset quality:  Sudden    Duration:  1 hour    Timing:  Constant    Progression:  Unchanged  Chronicity:  New  Dislocation: yes (Pt says it appeared dislocated it and he reduced it on his own prior to arrival.)    Prior injury to area:  No  Relieved by:  Nothing  Worsened by:  Abduction and bearing weight  Associated symptoms: decreased ROM, numbness, swelling (swelling to both medial and lateral aspects of left ankle.) and tingling    Associated symptoms: no back pain        Review of Systems   Constitutional: Negative.    Respiratory: Negative.    Cardiovascular: Negative.  Negative for leg swelling.   Gastrointestinal: Negative.    Musculoskeletal: Positive for arthralgias (left ankle and foot), gait problem and joint swelling (Left ankle; both medial and lateral). Negative for back pain.   Neurological: Positive for numbness (numbness and " tingling to toes on left foot.).   All other systems reviewed and are negative.      Past Medical History:   Diagnosis Date   • Allergic rhinitis    • Asthma    • Atopic dermatitis    • Eczema        No Known Allergies    Past Surgical History:   Procedure Laterality Date   • TONSILLECTOMY         Family History   Problem Relation Age of Onset   • Asthma Father        Social History     Socioeconomic History   • Marital status: Single   Tobacco Use   • Smoking status: Never Smoker   • Smokeless tobacco: Never Used   Vaping Use   • Vaping Use: Never used   Substance and Sexual Activity   • Alcohol use: Yes     Comment: occasional   • Drug use: No   • Sexual activity: Defer           Objective   Physical Exam  Vitals and nursing note reviewed.   Constitutional:       Appearance: Normal appearance.      Comments: Patient appears uncomfortable secondary to acute left ankle pain.   HENT:      Head: Normocephalic and atraumatic.      Mouth/Throat:      Mouth: Mucous membranes are moist.   Eyes:      Extraocular Movements: Extraocular movements intact.      Conjunctiva/sclera: Conjunctivae normal.      Pupils: Pupils are equal, round, and reactive to light.   Cardiovascular:      Rate and Rhythm: Normal rate and regular rhythm.      Pulses: Normal pulses.           Dorsalis pedis pulses are 2+ on the right side and 2+ on the left side.        Posterior tibial pulses are 2+ on the right side and 2+ on the left side.      Heart sounds: Normal heart sounds.      Comments: Positive bilateral DP and PT pulses.  Brisk capillary refill bilaterally.  Pulmonary:      Effort: Pulmonary effort is normal.      Breath sounds: Normal breath sounds.   Abdominal:      General: Bowel sounds are normal.      Palpations: Abdomen is soft.   Musculoskeletal:      Cervical back: Normal range of motion and neck supple.      Left ankle: Swelling present. Tenderness present over the lateral malleolus and medial malleolus. Decreased range of  motion. Normal pulse.      Left Achilles Tendon: Normal.      Comments: Swelling noted to left medial and lateral malleolus with tenderness to palpation.  There is also tenderness to the left distal fibula.  Tenderness to the posterior ankle, as well.  There is no particular tenderness to the left foot.  Limited range of motion.  Patient unable to bear weight.   Skin:     General: Skin is warm and dry.      Findings: No bruising or ecchymosis.      Comments: Soft tissue swelling to the left medial and lateral ankle.  No obvious deformity.   Neurological:      General: No focal deficit present.      Mental Status: He is alert.         Splint - Cast - Strapping    Date/Time: 6/21/2022 10:02 PM  Performed by: Kacie Palencia PA-C  Authorized by: Nelson Lau MD     Consent:     Consent obtained:  Verbal    Consent given by:  Patient    Risks, benefits, and alternatives were discussed: yes      Risks discussed:  Discoloration, numbness, pain and swelling  Universal protocol:     Patient identity confirmed:  Verbally with patient  Pre-procedure details:     Distal neurologic exam:  Normal    Distal perfusion: distal pulses strong and brisk capillary refill    Procedure details:     Location:  Ankle    Ankle location:  L ankle    Strapping: yes      Cast type:  Short leg    Splint type:  Ankle stirrup    Supplies:  Cotton padding (Ortho glass with increased web roll underneath)    Attestation: Splint applied and adjusted personally by me    Post-procedure details:     Distal neurologic exam:  Normal    Distal perfusion: distal pulses strong and brisk capillary refill      Procedure completion:  Tolerated well, no immediate complications    Post-procedure imaging: not applicable                 ED Course  ED Course as of 06/22/22 0432   e Jun 21, 202221, 2022 2127 X-rays of the left foot and ankle reveal a trimalleolar fracture of the ankle.  Lateral view of the foot confirms posterior malleoli are fracture suspected  on lateral ankle view.  Patient also has a spiral fracture of the distal fibula and transverse fracture of the medial malleolus.  I paged on-call orthopedist, Dr. Porter, to discuss the case. [FC]   2208 Dr. Lau, ER attending physician, came and personally evaluated patient, as well.  Patient is neurovascularly intact.  There is no need for reduction.  Apparently, patient reduced the ankle injury on his own prior to arrival.  I personally applied Ortho-Glass ankle stirrup and patient was neurovascularly intact following splint placement.  We applied some ice as needed for swelling and we gave oral pain meds, but patient still having significant pain.  We administered Dilaudid 1 mg IM injection.  I advised patient to contact Dr. Porter's office first thing in the morning.  I discussed the case with Dr. Porter and he said that they would get patient in quickly. Patient is agreeable with above treatment plan. [FC]      ED Course User Index  [FC] Kacie Palencia, SATURNINO      No results found for this or any previous visit (from the past 24 hour(s)).  Note: In addition to lab results from this visit, the labs listed above may include labs taken at another facility or during a different encounter within the last 24 hours. Please correlate lab times with ED admission and discharge times for further clarification of the services performed during this visit.    XR Ankle 3+ View Left   Final Result   Bimalleolar fracture at least, with spiral fracture of the fibula and   transverse fracture of the medial malleolus. Lateral view is suspicious   for nondisplaced posterior malleolar fracture as well.       This report was finalized on 6/21/2022 9:15 PM by Dr. Roberto Swift MD.          XR Foot 3+ View Left   Final Result       1. Trimalleolar fracture of the ankle. Lateral view of the foot confirms   the posterior malleolar fracture suspected on the lateral ankle view.   2. Remaining bony structures appear anatomically aligned and intact.      "  This report was finalized on 6/21/2022 9:19 PM by Dr. Roberto Swift MD.            Vitals:    06/21/22 1935 06/21/22 2154   BP: 131/88 128/85   BP Location: Left arm Left arm   Patient Position: Sitting Lying   Pulse: 118 90   Resp: 20 20   Temp: 97.5 °F (36.4 °C)    TempSrc: Oral    SpO2: 100% 94%   Weight: 61.2 kg (135 lb)    Height: 167.6 cm (66\")      Medications   oxyCODONE-acetaminophen (PERCOCET) 5-325 MG per tablet 1 tablet (1 tablet Oral Given 6/21/22 2122)   ondansetron ODT (ZOFRAN-ODT) disintegrating tablet 4 mg (4 mg Oral Given 6/21/22 2122)   HYDROmorphone (DILAUDID) injection 1 mg (1 mg Intramuscular Given 6/21/22 2157)     ECG/EMG Results (last 24 hours)     ** No results found for the last 24 hours. **        No orders to display     DONNY query complete. Treatment plan to include limited course of prescribed  controlled substance. Risks including addiction, benefits, and alternatives presented to patient.                                  DONNY reviewed by Nelson Lau MD       [facility]    Final diagnoses:   Closed trimalleolar fracture of left ankle, initial encounter   Impaired ambulation   History of asthma       ED Disposition  ED Disposition     ED Disposition   Discharge    Condition   Stable    Comment   --             Douglas Porter MD  [address] [address]  [phone]    Call in 1 day  Call in the morning for first available recheck, more than likely tomorrow    [facility] Emergency Department  [facility] [facility] [address] [phone]    If symptoms worsen         Medication List      New Prescriptions    ondansetron ODT 4 MG disintegrating tablet  Commonly known as: ZOFRAN-ODT  Place 1 tablet on the tongue Every 4 (Four) Hours.     oxyCODONE-acetaminophen 5-325 MG per tablet  Commonly known as: PERCOCET  Take 1 tablet by mouth Every 4 (Four) Hours As Needed for Moderate Pain .           Where to Get Your " Medications      These medications were sent to [facility]/pharmacy #[id number] - [place], [place] - [address] - [phone] PH - [phone] FX  [address], [facility] [address]    Hours: 24-hours Phone: [phone]   · ondansetron ODT 4 MG disintegrating tablet  · oxyCODONE-acetaminophen 5-325 MG per tablet          Kacie Palencia, PAShabanaC  06/22/22 9029

## 2022-06-23 ENCOUNTER — ANESTHESIA EVENT (OUTPATIENT)
Dept: PERIOP | Facility: HOSPITAL | Age: 27
End: 2022-06-23

## 2022-06-23 ENCOUNTER — ANESTHESIA EVENT CONVERTED (OUTPATIENT)
Dept: ANESTHESIOLOGY | Facility: HOSPITAL | Age: 27
End: 2022-06-23

## 2022-06-23 ENCOUNTER — APPOINTMENT (OUTPATIENT)
Dept: GENERAL RADIOLOGY | Facility: HOSPITAL | Age: 27
End: 2022-06-23

## 2022-06-23 ENCOUNTER — ANESTHESIA (OUTPATIENT)
Dept: PERIOP | Facility: HOSPITAL | Age: 27
End: 2022-06-23

## 2022-06-23 ENCOUNTER — HOSPITAL ENCOUNTER (OUTPATIENT)
Facility: HOSPITAL | Age: 27
Setting detail: HOSPITAL OUTPATIENT SURGERY
Discharge: HOME | End: 2022-06-23
Attending: ORTHOPAEDIC SURGERY | Admitting: ORTHOPAEDIC SURGERY

## 2022-06-23 VITALS
SYSTOLIC BLOOD PRESSURE: 121 MMHG | BODY MASS INDEX: 21.69 KG/M2 | HEIGHT: 66 IN | OXYGEN SATURATION: 99 % | RESPIRATION RATE: 14 BRPM | HEART RATE: 99 BPM | TEMPERATURE: 98.7 F | DIASTOLIC BLOOD PRESSURE: 79 MMHG | WEIGHT: 135 LBS

## 2022-06-23 DIAGNOSIS — S82.852A TRIMALLEOLAR FRACTURE OF ANKLE, CLOSED, LEFT, INITIAL ENCOUNTER: Primary | ICD-10-CM

## 2022-06-23 PROCEDURE — 25010000002 DEXAMETHASONE SODIUM PHOSPHATE 10 MG/ML SOLUTION: Performed by: ORTHOPAEDIC SURGERY

## 2022-06-23 PROCEDURE — 25010000002 ONDANSETRON PER 1 MG: Performed by: NURSE ANESTHETIST, CERTIFIED REGISTERED

## 2022-06-23 PROCEDURE — 25010000002 PROPOFOL 10 MG/ML EMULSION: Performed by: NURSE ANESTHETIST, CERTIFIED REGISTERED

## 2022-06-23 PROCEDURE — 25010000002 CEFAZOLIN IN DEXTROSE 2-4 GM/100ML-% SOLUTION: Performed by: ORTHOPAEDIC SURGERY

## 2022-06-23 PROCEDURE — C1769 GUIDE WIRE: HCPCS | Performed by: ORTHOPAEDIC SURGERY

## 2022-06-23 PROCEDURE — C1713 ANCHOR/SCREW BN/BN,TIS/BN: HCPCS | Performed by: ORTHOPAEDIC SURGERY

## 2022-06-23 PROCEDURE — 25010000002 MIDAZOLAM PER 1 MG: Performed by: ORTHOPAEDIC SURGERY

## 2022-06-23 PROCEDURE — 25010000002 FENTANYL CITRATE (PF) 50 MCG/ML SOLUTION: Performed by: ORTHOPAEDIC SURGERY

## 2022-06-23 PROCEDURE — 25010000002 ROPIVACAINE PER 1 MG: Performed by: NURSE ANESTHETIST, CERTIFIED REGISTERED

## 2022-06-23 PROCEDURE — 25010000002 BUPRENORPHINE PER 0.1 MG: Performed by: ORTHOPAEDIC SURGERY

## 2022-06-23 PROCEDURE — 25010000002 DEXAMETHASONE PER 1 MG: Performed by: NURSE ANESTHETIST, CERTIFIED REGISTERED

## 2022-06-23 PROCEDURE — 76000 FLUOROSCOPY <1 HR PHYS/QHP: CPT

## 2022-06-23 DEVICE — IMPLANTABLE DEVICE: Type: IMPLANTABLE DEVICE | Site: ANKLE | Status: FUNCTIONAL

## 2022-06-23 DEVICE — SCRW LP TI 3.5X12MM: Type: IMPLANTABLE DEVICE | Site: ANKLE | Status: FUNCTIONAL

## 2022-06-23 DEVICE — SCRW LP NL TI 3.5X14MM: Type: IMPLANTABLE DEVICE | Site: ANKLE | Status: FUNCTIONAL

## 2022-06-23 DEVICE — SCRW CORT FT/ANKL L/P TI 3X20MM: Type: IMPLANTABLE DEVICE | Site: ANKLE | Status: FUNCTIONAL

## 2022-06-23 RX ORDER — SODIUM CHLORIDE, SODIUM LACTATE, POTASSIUM CHLORIDE, CALCIUM CHLORIDE 600; 310; 30; 20 MG/100ML; MG/100ML; MG/100ML; MG/100ML
9 INJECTION, SOLUTION INTRAVENOUS CONTINUOUS PRN
Status: DISCONTINUED | OUTPATIENT
Start: 2022-06-23 | End: 2022-06-23 | Stop reason: HOSPADM

## 2022-06-23 RX ORDER — FENTANYL CITRATE 50 UG/ML
INJECTION, SOLUTION INTRAMUSCULAR; INTRAVENOUS
Status: COMPLETED | OUTPATIENT
Start: 2022-06-23 | End: 2022-06-23

## 2022-06-23 RX ORDER — DEXAMETHASONE SODIUM PHOSPHATE 10 MG/ML
INJECTION, SOLUTION INTRAMUSCULAR; INTRAVENOUS
Status: COMPLETED | OUTPATIENT
Start: 2022-06-23 | End: 2022-06-23

## 2022-06-23 RX ORDER — LIDOCAINE HYDROCHLORIDE 10 MG/ML
INJECTION, SOLUTION EPIDURAL; INFILTRATION; INTRACAUDAL; PERINEURAL AS NEEDED
Status: DISCONTINUED | OUTPATIENT
Start: 2022-06-23 | End: 2022-06-23 | Stop reason: SURG

## 2022-06-23 RX ORDER — FENTANYL CITRATE 50 UG/ML
50 INJECTION, SOLUTION INTRAMUSCULAR; INTRAVENOUS
Status: DISCONTINUED | OUTPATIENT
Start: 2022-06-23 | End: 2022-06-23 | Stop reason: HOSPADM

## 2022-06-23 RX ORDER — LIDOCAINE HYDROCHLORIDE 10 MG/ML
0.5 INJECTION, SOLUTION EPIDURAL; INFILTRATION; INTRACAUDAL; PERINEURAL ONCE AS NEEDED
Status: COMPLETED | OUTPATIENT
Start: 2022-06-23 | End: 2022-06-23

## 2022-06-23 RX ORDER — FAMOTIDINE 20 MG/1
20 TABLET, FILM COATED ORAL
Status: COMPLETED | OUTPATIENT
Start: 2022-06-23 | End: 2022-06-23

## 2022-06-23 RX ORDER — BUPIVACAINE HYDROCHLORIDE 2.5 MG/ML
INJECTION, SOLUTION EPIDURAL; INFILTRATION; INTRACAUDAL
Status: COMPLETED | OUTPATIENT
Start: 2022-06-23 | End: 2022-06-23

## 2022-06-23 RX ORDER — OXYCODONE HYDROCHLORIDE 5 MG/1
5 TABLET ORAL EVERY 4 HOURS PRN
Qty: 30 TABLET | Refills: 0 | Status: SHIPPED | OUTPATIENT
Start: 2022-06-23

## 2022-06-23 RX ORDER — ROPIVACAINE HYDROCHLORIDE 2 MG/ML
INJECTION, SOLUTION EPIDURAL; INFILTRATION; PERINEURAL CONTINUOUS
Status: DISCONTINUED | OUTPATIENT
Start: 2022-06-23 | End: 2022-06-23 | Stop reason: HOSPADM

## 2022-06-23 RX ORDER — PROPOFOL 10 MG/ML
VIAL (ML) INTRAVENOUS AS NEEDED
Status: DISCONTINUED | OUTPATIENT
Start: 2022-06-23 | End: 2022-06-23 | Stop reason: SURG

## 2022-06-23 RX ORDER — SODIUM CHLORIDE 0.9 % (FLUSH) 0.9 %
10 SYRINGE (ML) INJECTION EVERY 12 HOURS SCHEDULED
Status: DISCONTINUED | OUTPATIENT
Start: 2022-06-23 | End: 2022-06-23 | Stop reason: HOSPADM

## 2022-06-23 RX ORDER — IPRATROPIUM BROMIDE AND ALBUTEROL SULFATE 2.5; .5 MG/3ML; MG/3ML
3 SOLUTION RESPIRATORY (INHALATION) ONCE AS NEEDED
Status: DISCONTINUED | OUTPATIENT
Start: 2022-06-23 | End: 2022-06-23 | Stop reason: HOSPADM

## 2022-06-23 RX ORDER — MAGNESIUM HYDROXIDE 1200 MG/15ML
LIQUID ORAL AS NEEDED
Status: DISCONTINUED | OUTPATIENT
Start: 2022-06-23 | End: 2022-06-23 | Stop reason: HOSPADM

## 2022-06-23 RX ORDER — ONDANSETRON 2 MG/ML
4 INJECTION INTRAMUSCULAR; INTRAVENOUS ONCE AS NEEDED
Status: DISCONTINUED | OUTPATIENT
Start: 2022-06-23 | End: 2022-06-23 | Stop reason: HOSPADM

## 2022-06-23 RX ORDER — DEXAMETHASONE SODIUM PHOSPHATE 4 MG/ML
INJECTION, SOLUTION INTRA-ARTICULAR; INTRALESIONAL; INTRAMUSCULAR; INTRAVENOUS; SOFT TISSUE AS NEEDED
Status: DISCONTINUED | OUTPATIENT
Start: 2022-06-23 | End: 2022-06-23 | Stop reason: SURG

## 2022-06-23 RX ORDER — CEFAZOLIN SODIUM 2 G/100ML
2 INJECTION, SOLUTION INTRAVENOUS ONCE
Status: COMPLETED | OUTPATIENT
Start: 2022-06-23 | End: 2022-06-23

## 2022-06-23 RX ORDER — DIPHENHYDRAMINE HCL 25 MG
25 CAPSULE ORAL EVERY 6 HOURS PRN
COMMUNITY

## 2022-06-23 RX ORDER — SODIUM CHLORIDE 0.9 % (FLUSH) 0.9 %
10 SYRINGE (ML) INJECTION AS NEEDED
Status: DISCONTINUED | OUTPATIENT
Start: 2022-06-23 | End: 2022-06-23 | Stop reason: HOSPADM

## 2022-06-23 RX ORDER — ONDANSETRON 2 MG/ML
INJECTION INTRAMUSCULAR; INTRAVENOUS AS NEEDED
Status: DISCONTINUED | OUTPATIENT
Start: 2022-06-23 | End: 2022-06-23 | Stop reason: SURG

## 2022-06-23 RX ORDER — MIDAZOLAM HYDROCHLORIDE 1 MG/ML
1 INJECTION INTRAMUSCULAR; INTRAVENOUS
Status: DISCONTINUED | OUTPATIENT
Start: 2022-06-23 | End: 2022-06-23 | Stop reason: HOSPADM

## 2022-06-23 RX ORDER — BUPIVACAINE HYDROCHLORIDE 5 MG/ML
INJECTION, SOLUTION EPIDURAL; INTRACAUDAL
Status: COMPLETED | OUTPATIENT
Start: 2022-06-23 | End: 2022-06-23

## 2022-06-23 RX ORDER — ASPIRIN 325 MG
325 TABLET, DELAYED RELEASE (ENTERIC COATED) ORAL DAILY
Qty: 14 TABLET | Refills: 0 | Status: SHIPPED | OUTPATIENT
Start: 2022-06-24 | End: 2022-07-08

## 2022-06-23 RX ORDER — MIDAZOLAM HYDROCHLORIDE 1 MG/ML
INJECTION INTRAMUSCULAR; INTRAVENOUS
Status: COMPLETED | OUTPATIENT
Start: 2022-06-23 | End: 2022-06-23

## 2022-06-23 RX ORDER — BUPRENORPHINE HYDROCHLORIDE 0.32 MG/ML
INJECTION INTRAMUSCULAR; INTRAVENOUS
Status: COMPLETED | OUTPATIENT
Start: 2022-06-23 | End: 2022-06-23

## 2022-06-23 RX ADMIN — FENTANYL CITRATE 100 MCG: 50 INJECTION, SOLUTION INTRAMUSCULAR; INTRAVENOUS at 13:03

## 2022-06-23 RX ADMIN — LIDOCAINE HYDROCHLORIDE 0.5 ML: 10 INJECTION, SOLUTION EPIDURAL; INFILTRATION; INTRACAUDAL; PERINEURAL at 12:41

## 2022-06-23 RX ADMIN — Medication 1000 MG: at 15:26

## 2022-06-23 RX ADMIN — PROPOFOL 200 MG: 10 INJECTION, EMULSION INTRAVENOUS at 13:58

## 2022-06-23 RX ADMIN — BUPRENORPHINE HYDROCHLORIDE 0.3 MG: 0.32 INJECTION INTRAMUSCULAR; INTRAVENOUS at 13:03

## 2022-06-23 RX ADMIN — BUPIVACAINE HYDROCHLORIDE 30 ML: 2.5 INJECTION, SOLUTION EPIDURAL; INFILTRATION; INTRACAUDAL; PERINEURAL at 12:54

## 2022-06-23 RX ADMIN — BUPIVACAINE HYDROCHLORIDE 30 ML: 5 INJECTION, SOLUTION EPIDURAL; INTRACAUDAL; PERINEURAL at 13:03

## 2022-06-23 RX ADMIN — MIDAZOLAM HYDROCHLORIDE 2 MG: 1 INJECTION, SOLUTION INTRAMUSCULAR; INTRAVENOUS at 13:03

## 2022-06-23 RX ADMIN — LIDOCAINE HYDROCHLORIDE 50 MG: 10 INJECTION, SOLUTION EPIDURAL; INFILTRATION; INTRACAUDAL; PERINEURAL at 13:58

## 2022-06-23 RX ADMIN — DEXAMETHASONE SODIUM PHOSPHATE 2 MG: 10 INJECTION, SOLUTION INTRAMUSCULAR; INTRAVENOUS at 13:03

## 2022-06-23 RX ADMIN — FAMOTIDINE 20 MG: 20 TABLET ORAL at 12:41

## 2022-06-23 RX ADMIN — CEFAZOLIN SODIUM 2 G: 2 INJECTION, SOLUTION INTRAVENOUS at 14:01

## 2022-06-23 RX ADMIN — ONDANSETRON 4 MG: 2 INJECTION INTRAMUSCULAR; INTRAVENOUS at 14:46

## 2022-06-23 RX ADMIN — SODIUM CHLORIDE, POTASSIUM CHLORIDE, SODIUM LACTATE AND CALCIUM CHLORIDE 9 ML/HR: 600; 310; 30; 20 INJECTION, SOLUTION INTRAVENOUS at 12:48

## 2022-06-23 RX ADMIN — DEXAMETHASONE SODIUM PHOSPHATE 4 MG: 4 INJECTION, SOLUTION INTRA-ARTICULAR; INTRALESIONAL; INTRAMUSCULAR; INTRAVENOUS; SOFT TISSUE at 14:01

## 2022-06-23 NOTE — OP NOTE
PREOPERATIVE DIAGNOSIS: Left ankle displaced trimalleolar fracture.    POSTOPERATIVE DIAGNOSIS: Left ankle displaced trimalleolar fracture.    OPERATIVE PROCEDURE: Open reduction and internal fixation, medial and lateral malleolus of a trimalleolar fracture without fixation of the posterior lip.    ANESTHESIA: General with popliteal catheter.     PRINCIPAL SURGEON: Douglas Porter M.D.    ASSISTANT: JR Stephan RN, first assist  Assistant was necessary for the case for fixation of the fracture, closure, and splint application.    ESTIMATED BLOOD LOSS: Minimal.    COMPLICATIONS: None.    SPECIMENS: None.    IMPLANTS USED: Arthrex 4.0 mm cannulated screws for medial malleolus fixation and 1/3rd tubular plate, 7-hole for lateral malleolus fixation.     TOURNIQUET TIME: 48 minutes at 300 mmHg.    INDICATIONS FOR PROCEDURE: Darnell is a very pleasant 27-year-old male who injured his left ankle playing kick ball 2 days ago. He went to [facility] ER and x-rays revealed a trimalleolar fracture. He was placed in Ortho-Glass ankle stirrup splint. We saw him in the office yesterday. I discussed with the patient and his parents that this was a unstable ankle fracture for which I would recommend proceeding with open reduction and internal fixation. All the risks, benefits, and alternatives were discussed in detail. They agreed to proceed and surgical consent form was signed.     OPERATIVE PROCEDURE: Seen in the preoperative holding area. The left ankle was marked to confirm the correct operative site. He was seen by anesthesia and received Ancef 2 g IV prophylactic antibiotics within one hour of incision time. Anesthesia performed with popliteal catheter for perioperative pain control. He was brought back to the operating room. General anesthesia was induced without difficulty. Nonsterile tourniquet was applied to the left thigh. The left lower extremity was prepped and draped in the usual sterile fashion. Timeout was  performed to confirm open reduction and internal fixation of left ankle fracture. The left leg was then exsanguinated with Esmarch and tourniquet was inflated to 250 mmHg. I then made a longitudinal incision over the distal fibula. This was carried down through subcutaneous tissue. Adequate hemostasis was maintained with Bovie electrocautery. A 15 blade scalpel was used to clean up the fracture site. There was a spiral fracture at the joint line, this was reduced with a lobster claw clamp bringing the fracture back out to the length and restoring rotation. Satisfied with reduction I then placed 1 3.0 mm bicortical lag screw, going anterior to posterior using standard lag screw technique. Clamp was removed and lag screw maintained reduction. We then selected the 7-hole 1/3rd tubular plate. It was bent to contour to the distal fibula. We placed under direct C-arm visualization 1 approximal 3.5 mm bicortical screw after pre-drilling with a T5 drill. We then placed 2 distal 4.0 mm cancellus screws with excellent purchase and then 2 more bicortical screws proximal to the fracture site. We then switched our attention to the medial malleolus. A curvilinear incision was made over the tip of the medial malleolus. The medial malleolus fracture was reduced with manual reduction, confirmed with C-arm visualization. We then placed 2 K-wires in the tip of the medial malleolus up in the distal tibia. Satisfied with the placement in the AP and lateral planes we then predrilled the near cortex and then placed 2 4.0 mm cannulated screws, both 40 mm in length across the medial malleolus fracture. Both of those had excellent purchase. K-wires were then removed. We then took final pictures in the AP and lateral planes which confirmed adequate reduction of the medial and lateral malleolus fractures with reduction of the mortise. No widening of the medial clear space of syndesmosis. Posterior fracture was nondisplaced and involved the  very posterior lip so it was left alone. At this time the C-arm was backed out and the wound was copiously irrigated with betadine and soak saline. We then proceeded with closure. The fascia was closed with 0 Vicryl on the lateral side and the dermis was closed with 2-0 Vicryl. The skin was closed with a running 2-0 horizontal mattress nylon suture. We then applied sterile dressing with Xeroform, 4x4, ABD, Sof-Rol and a 6 inch Ace bandage over a posterior mold with side flaps and ankle in neutral. Tourniquet was deflated at 48 minutes. Anesthesia was reversed without difficulty. He was taken to the recovery room in stable condition. All sponge and needle counts were correct ×2.       POSTOPERATIVE PLAN: He will be discharged home from recovery. He will be strict non-weightbearing with ice and elevation of the left lower extremity. He will keep his short leg splint clean and dry until his follow-up appointment in 2 weeks. He will take oxycodone as needed for pain and aspirin for deep venous thrombosis prophylaxis. We will see him back in 2 weeks. In the meantime should call [phone] with questions or concerns.

## 2022-06-23 NOTE — H&P
"  Pre-Op H&P  Darnell Garnett  [id number]  1995      Chief complaint: Left ankle pain      Subjective:  Patient is a 27 y.o.male presents for scheduled surgery by Dr. Porter. He anticipates a LEFT ANKLE OPEN REDUCTION INTERNAL FIXATION TRIMALLEOLAR FRACTURE today. He had Er visit 6/21/22 due to left ankle pain. He was playing kickball trying to slide into the base and he says that his left ankle bent laterally and he heard a loud \"pop\".  He continues to have severe pain. xrays show trimalleolar fracture left ankle. He was placed in a splint and has been using crutches.      Review of Systems:  Constitutional-- No fever, chills or sweats. No fatigue.  CV-- No chest pain, palpitation or syncope  Resp-- No SOB, cough, hemoptysis. +Asthma   Skin--No rashes or lesions      Allergies: No Known Allergies      Home Meds:  Medications Prior to Admission   Medication Sig Dispense Refill Last Dose   • Dupilumab, Asthma, (DUPIXENT SC) Inject 2 mL under the skin into the appropriate area as directed.   6/20/2022 at Unknown time   • ondansetron ODT (ZOFRAN-ODT) 4 MG disintegrating tablet Place 1 tablet on the tongue Every 4 (Four) Hours. 12 tablet 0 6/22/2022 at 1200   • oxyCODONE-acetaminophen (PERCOCET) 5-325 MG per tablet Take 1 tablet by mouth Every 4 (Four) Hours As Needed for Moderate Pain . 12 tablet 0 6/23/2022 at 0600   • tacrolimus (PROTOPIC) 0.1 % ointment Apply 1 application topically to the appropriate area as directed 2 (Two) Times a Week. 30 g 3 6/23/2022 at 0800   • albuterol (PROVENTIL HFA;VENTOLIN HFA) 108 (90 Base) MCG/ACT inhaler Inhale 2 puffs Every 4 (Four) Hours As Needed for Wheezing or Shortness of Air. 18 g 3 More than a month at Unknown time   • amphetamine-dextroamphetamine XR (ADDERALL XR) 30 MG 24 hr capsule Take 1 capsule by mouth 1 (One) Time      • diphenhydrAMINE (BENADRYL) 25 mg capsule Take 25 mg by mouth Every 6 (Six) Hours As Needed for Itching.   6/21/2022   • hydrocortisone " "(ANUSOL-HC) 2.5 % rectal cream Insert  into the rectum 2 (Two) Times a Day. For 7-10 days 28.35 g 0    • mometasone-formoterol (DULERA) 100-5 MCG/ACT inhaler Inhale 1 puff 2 (Two) Times a Day. 13 g 6 More than a month at Unknown time   • polyethylene glycol (MIRALAX) powder Take 17 g by mouth Daily. 850 g 1          PMH:   Past Medical History:   Diagnosis Date   • Allergic rhinitis    • Asthma    • Atopic dermatitis    • Eczema      PSH:    Past Surgical History:   Procedure Laterality Date   • TONSILLECTOMY         Immunization History:  Influenza: 2021  Pneumococcal: No  Tetanus: UTD  Covid 1 of 2 and J&J: 2021    Social History:   Tobacco:   Social History     Tobacco Use   Smoking Status Never Smoker   Smokeless Tobacco Never Used      Alcohol:     Social History     Substance and Sexual Activity   Alcohol Use Yes    Comment: occasional         Physical Exam:/73 (BP Location: Right arm, Patient Position: Lying)   Pulse 104   Temp 97.1 °F (36.2 °C) (Temporal)   Resp 14   Ht 167.6 cm (66\")   Wt 61.2 kg (135 lb)   SpO2 100%   BMI 21.79 kg/m²       General Appearance:    Alert, cooperative, no distress, appears stated age   Head:    Normocephalic, without obvious abnormality, atraumatic   Lungs:     Clear to auscultation bilaterally, respirations unlabored    Heart:   Regular rate and rhythm, S1 and S2 normal    Abdomen:    Soft without tenderness   Extremities:   Extremities normal, atraumatic, no cyanosis or edema   Skin:   Skin color, texture, turgor normal, no rashes or lesions   Neurologic:   Grossly intact     Results Review:     LABS:  Lab Results   Component Value Date    WBC 10.16 04/29/2019    HGB 14.3 04/29/2019    HCT 44.6 04/29/2019    MCV 94.3 04/29/2019     04/29/2019    NEUTROABS 5.19 11/07/2018    GLUCOSE 108 (H) 04/29/2019    BUN 21 (H) 04/29/2019    CREATININE 1.06 04/29/2019    EGFRIFNONA 86 04/29/2019     04/29/2019    K 3.9 04/29/2019    CL 99 04/29/2019    CO2 27.4 " 04/29/2019    CALCIUM 9.9 04/29/2019       RADIOLOGY:  6/21/22 ankle xray:  FINDINGS:  There is a spiral fracture of the distal fibula with no significant  comminution and transverse fracture of the medial malleolus which is  mildly displaced. The lateral view initially appears to just show the  superimposed spiral fracture line of the distal fibula on the posterior  margin of the tibia. With magnification, there also appears to be  interruption of the cortical margin of the posterior malleolus both  superiorly and inferiorly, suspicious for posterior malleolar fracture.  Remaining bony structures appear to be intact.     IMPRESSION:  Bimalleolar fracture at least, with spiral fracture of the fibula and  transverse fracture of the medial malleolus. Lateral view is suspicious  for nondisplaced posterior malleolar fracture as well.    6/21/22 foot xray:  FINDINGS:  Medial and lateral malleolar fractures are again seen. Posterior  malleolar fracture, somewhat equivocal on the lateral ankle view, is now  clearly seen on the lateral foot view, nondisplaced and noncomminuted.  Remaining bones of the foot appear to be intact. Joint spaces are  well-maintained.      IMPRESSION:     1. Trimalleolar fracture of the ankle. Lateral view of the foot confirms  the posterior malleolar fracture suspected on the lateral ankle view.  2. Remaining bony structures appear anatomically aligned and intact.    I reviewed the patient's new clinical results.    Cancer Staging (if applicable)  Cancer Patient: __ yes __no __unknown; If yes, clinical stage T:__ N:__M:__, stage group or __N/A      Impression: Trimalleolar fracture of the left ankle      Plan: LEFT ANKLE OPEN REDUCTION INTERNAL FIXATION TRIMALLEOLAR FRACTURE      Bell Cordoba, SARA   6/23/2022   12:52 EDT

## 2022-06-23 NOTE — ADDENDUM NOTE
Addendum  created 06/23/22 1718 by Simba Valle CRNA    Clinical Note Signed, Diagnosis association updated, Intraprocedure Blocks edited, LDA updated via procedure documentation

## 2022-06-23 NOTE — ANESTHESIA PREPROCEDURE EVALUATION
Anesthesia Evaluation     Patient summary reviewed and Nursing notes reviewed   no history of anesthetic complications:  NPO Solid Status: > 8 hours  NPO Liquid Status: > 2 hours           Airway   Mallampati: II  TM distance: >3 FB  Neck ROM: full  Possible difficult intubation  Dental - normal exam     Pulmonary     breath sounds clear to auscultation  (+) asthma,  Cardiovascular   Exercise tolerance: excellent (>7 METS)    Rhythm: regular  Rate: normal        Neuro/Psych  (+) psychiatric history,    GI/Hepatic/Renal/Endo    (+)  GI bleeding upper resolved,     Musculoskeletal     Abdominal   (-) obese    Abdomen: soft.   Substance History      OB/GYN          Other        (-) arthritis                  Anesthesia Plan    ASA 2     general with block     intravenous induction     Anesthetic plan, risks, benefits, and alternatives have been provided, discussed and informed consent has been obtained with: patient.    Plan discussed with CRNA.        CODE STATUS:

## 2022-06-23 NOTE — ANESTHESIA PROCEDURE NOTES
Airway  Urgency: elective    Date/Time: 6/23/2022 1:59 PM  Airway not difficult    General Information and Staff    Patient location during procedure: OR  CRNA/CAA: Joey Williamson CRNA    Indications and Patient Condition  Indications for airway management: airway protection    Preoxygenated: yes  Mask difficulty assessment: 1 - vent by mask    Final Airway Details  Final airway type: supraglottic airway      Successful airway: I-gel  Size 4    Number of attempts at approach: 1  Assessment: lips, teeth, and gum same as pre-op    Additional Comments  LMA placed without difficulty, ventilation with assist, equal breath sounds and symmetric chest rise and fall

## 2022-06-23 NOTE — ANESTHESIA POSTPROCEDURE EVALUATION
Patient: Darnell Garnett    Procedure Summary     Date: 06/23/22 Room / Location:  [facility] /  [facility]    Anesthesia Start: 1354 Anesthesia Stop:     Procedure: LEFT ANKLE OPEN REDUCTION INTERNAL FIXATION TRIMALLEOLAR FRACTURE (Left Ankle) Diagnosis:     Surgeons: Douglas Porter MD Provider: Simba Briseno MD    Anesthesia Type: general with block ASA Status: 2          Anesthesia Type: general with block    Vitals  No vitals data found for the desired time range.          Post Anesthesia Care and Evaluation    Patient location during evaluation: PACU  Patient participation: complete - patient participated  Level of consciousness: awake and alert  Pain management: adequate    Airway patency: patent  Anesthetic complications: No anesthetic complications  PONV Status: none  Cardiovascular status: hemodynamically stable and acceptable  Respiratory status: nonlabored ventilation, acceptable and room air  Hydration status: acceptable

## 2022-06-23 NOTE — ANESTHESIA PROCEDURE NOTES
Adductor SS      Patient reassessed immediately prior to procedure    Patient location during procedure: post-op  Reason for block: at surgeon's request and post-op pain management  Performed by  CRNA/CAA: Simba Valle CRNA  Assisted by: Iveth Sheikh RNSRNA: Minoo Taylor SRNA  Preanesthetic Checklist  Completed: patient identified, IV checked, site marked, risks and benefits discussed, surgical consent, monitors and equipment checked, pre-op evaluation and timeout performed  Prep:  Pt Position: supine  Sterile barriers:cap, gloves, mask and sterile barriers  Prep: ChloraPrep  Patient monitoring: blood pressure monitoring, continuous pulse oximetry and EKG  Procedure  Performed under: local infiltration  Guidance:ultrasound guided  Images:still images obtained, printed/placed on chart    Laterality:left  Block Type:adductor canal block  Injection Technique:single-shot  Needle Type:echogenic and short-bevel  Needle Gauge:20 G  Resistance on Injection: none  Catheter size: 20g.    Medications Used: fentaNYL citrate (PF) (SUBLIMAZE) injection, 100 mcg  midazolam (VERSED) injection, 2 mg  buprenorphine (BUPRENEX) injection, 0.3 mg  dexamethasone sodium phosphate injection, 2 mg  bupivacaine (PF) (MARCAINE) 0.5 % injection, 30 mL  Med administered at 6/23/2022 1:03 PM      Post Assessment  Injection Assessment: negative aspiration for heme, incremental injection and no paresthesia on injection  Patient Tolerance:comfortable throughout block  Complications:no  Additional Notes  Procedure:             The pt was placed in the Supine position.  The Insertion site was  prepped and Draped in sterile fashion.  The pt was anesthetized with  IV Sedation( see meds).  Skin and cutaneous tissue was infiltrated and anesthetized with 1% Lidocaine 3 mls via a 25g needle.  A BBraun 4 inch 18g echogenic needle was then  inserted approximately midline, mid-thigh and advanced In-plane with Ultrasound guidance.  Normal Saline PSF  was utilized for hydrodissection of tissue.  The Vastus medialis and Sartorius muscle where visualized and the needle tip was placed in the adductor canal,  lateral to the femoral artery.  LA injection spread was visualized, injection was incremental 1-5ml, injection pressure was normal or little, no intraneural injection, no vascular injection.  LA dose was injected thru the needle(see dose above).

## 2022-06-23 NOTE — ANESTHESIA PROCEDURE NOTES
Popliteal Cath      Patient reassessed immediately prior to procedure    Patient location during procedure: pre-op  Reason for block: at surgeon's request and post-op pain management  Performed by  CRNA/CAA: Simba Valle CRNA  Assisted by: Iveth Sheikh RNSRNA: Minoo Taylor SRNA  Preanesthetic Checklist  Completed: patient identified, IV checked, site marked, risks and benefits discussed, surgical consent, monitors and equipment checked, pre-op evaluation and timeout performed  Prep:  Sterile barriers:cap, gloves, mask, sterile barriers and washed/disinfected hands  Prep: ChloraPrep  Patient monitoring: blood pressure monitoring, continuous pulse oximetry and EKG  Procedure    Sedation: yes  Performed under: local infiltration  Guidance:ultrasound guided  Images:still images obtained, printed/placed on chart    Laterality:left  Block Type:popliteal  Injection Technique:catheter  Needle Type:echogenic and Tuohy  Needle Gauge:18 G  Resistance on Injection: none  Catheter Size:20 G  Cath Depth at skin: 7 cm    Medications Used: bupivacaine PF (MARCAINE) 0.25 % injection, 30 mL  Med administered at 6/23/2022 12:54 PM      Post Assessment  Injection Assessment: negative aspiration for heme, no paresthesia on injection and incremental injection  Patient Tolerance:comfortable throughout block  Complications:no  Additional Notes  Procedure:                                                         The pt was placed in  lateral position.  The Insertion site was  prepped and Draped in sterile fashion.  The pt was anesthetized with  IV Sedation( see meds).  Skin and cutaneous tissue was infiltrated and anesthetized with 1% Lidocaine 3 mls via a 25g needle.  A BBraun 4 inch 18g echogenic needle was then  inserted approximately 3 cm proximal to the popliteal fossa at the lateral mid biceps femoris and advanced In-plane with Ultrasound guidance.  Normal Saline PSF was utilized for hydrodissection of tissue.  The popliteal  artery was visualized and the common peroneal and tibial bifurcation was located.  LA injection spread was visualized, injection was incremental 1-5ml, injection pressure was normal or little, no intraneural injection, no vascular injection.  .  A BBraun 20g wire stylet catheter was placed via the needle with ultrasound visualization and confirmation with NS fluid bolus. The labeled catheter was then secured at insertion site with exofin tissue adhesive, benzoin, steristrips  and a CHG transparent dressing was placed over. Thank you

## 2022-06-23 NOTE — BRIEF OP NOTE
ANKLE OPEN REDUCTION INTERNAL FIXATION  Progress Note    Darnell Garnett  6/23/2022    Pre-op Diagnosis:   left ankle trimalleolar fracture       Post-Op Diagnosis Codes:     * Closed displaced trimalleolar fracture of left ankle [S82.852A]    Procedure/CPT® Codes:  IL OPEN TX TRIMALLEOLAR ANKLE FX W/O FIX PST LIP [81777]      Procedure(s):  LEFT ANKLE OPEN REDUCTION INTERNAL FIXATION TRIMALLEOLAR FRACTURE without fixation posterior lip    Surgeon(s):  Douglas Porter MD     Assistant: BRIDGET Whittaker    Anesthesia: General with Block and popliteal catheter    Staff:   Circulator: Evonne Devlin RN; Nida France RN  Radiology Technologist: Florentino Irene RT  Scrub Person: Sandra Castillo; Ivette Cespedes  Nursing Assistant: Patricia Bolanos  Assistant: Kishor Rothman RNFA  Assistant: Kishor Rothman RNFA      Estimated Blood Loss: minimal    Urine Voided: * No values recorded between 6/23/2022  1:54 PM and 6/23/2022  3:12 PM *    Specimens:                None          Drains: * No LDAs found *    Findings: Left ankle displaced medial and lateral malleoli of a trimalleolar fracture        Complications: None    Implants: Arthrex 4.0 mm cannulated screws for medial malleolus and 7 hole one third tubular plate for lateral malleolus fixation    Tourniquet time: 48 minutes at 250 mmHg    Assistant: Kishor Rothman RNFA  was responsible for performing the following activities: Retraction, assistance with fixation of the fracture, closure and splint application and their skilled assistance was necessary for the success of this case.    Douglas Porter MD     Date: 6/23/2022  Time: 15:24 EDT

## 2022-06-24 NOTE — PROGRESS NOTES
ROSY MeloOwings    Nerve Cath Post Op Call    Patient Name: Darnell Garnett  :  1995  MRN:  [id number]  Date of Discharge: 2022    Treatment Plan            System called pain score 2. One pain pill two bolus

## 2022-06-26 NOTE — PROGRESS NOTES
ROSY Bruno    Nerve Cath Post Op Call    Patient Name: Darnell Garnett  :  1995  MRN:  [id number]  Date of Discharge: 2022    Nerve Cath Post Op Call:    Analgesia:Good  Pain Score:10  Side Effects:None  Catheter Site:clean  Catheter Plan:Will continue with plan at home without changes and The patient was instructed to call ON CALL Anesthesia provider for any questions or problems  Patient/Family instructed to call ON CALL anesthesia provider for any questions or problems.  Patient Follow Up:

## 2023-08-24 ENCOUNTER — OFFICE VISIT (OUTPATIENT)
Dept: INTERNAL MEDICINE | Facility: CLINIC | Age: 28
End: 2023-08-24
Payer: COMMERCIAL

## 2023-08-24 VITALS
DIASTOLIC BLOOD PRESSURE: 70 MMHG | BODY MASS INDEX: 20.73 KG/M2 | HEART RATE: 62 BPM | TEMPERATURE: 98.7 F | WEIGHT: 129 LBS | SYSTOLIC BLOOD PRESSURE: 110 MMHG | RESPIRATION RATE: 18 BRPM | HEIGHT: 66 IN

## 2023-08-24 DIAGNOSIS — J30.9 ALLERGIC RHINITIS, UNSPECIFIED SEASONALITY, UNSPECIFIED TRIGGER: ICD-10-CM

## 2023-08-24 DIAGNOSIS — L20.9 ATOPIC DERMATITIS, UNSPECIFIED TYPE: ICD-10-CM

## 2023-08-24 DIAGNOSIS — Z00.00 ANNUAL PHYSICAL EXAM: Primary | ICD-10-CM

## 2023-08-24 PROCEDURE — 99385 PREV VISIT NEW AGE 18-39: CPT | Performed by: NURSE PRACTITIONER

## 2023-08-24 NOTE — PROGRESS NOTES
Chief Complaint  Annual Exam (New patient. )    Subjective          Darnell Garnett presents to [facility]  History of Present Illness  The patient presents today to establish care.    Closed left trimalleolar fracture  In 06/2022 he fractured his left ankle while playing kickball. He was seen at [facility], [place], operated by [facility] for this and placed in a walking boot at the time. The patient has 8 screws in his ankle.    Rectal bleeding  In 2018 and 2019 he was seen by Dr. Davidson for rectal bleeding. No complications have occurred since. The patient was given a cream for this. Given the size, he may need surgical intervention.    Allergies  He is currently taking tacrolimus ointment for his face as needed.    Asthma  The patient has an albuterol inhaler he uses 1 to 2 times per year. Benadryl is taken at night for sleep and allergies in the morning.    Atopic dermatitis  For 4 to 5 years, he has been receiving Dupixent 200 mg injections as prescribed by [facility] in [place]. His doctors were switched a couple of times because he needed a prescription as it is managed with it. The dosage was decreased to what he is currently taking and is approximately 1.1 mL or 1.5 mL. The dosage was decreased because he experienced side effects with his eyes. The patient was wearing contacts, but now he always wears glasses and sporadically wears contacts. It is manageable now, but it used to be red all the time and was a major issue.    ADD  No medication is currently being taken for ADD. He used to take medication in the past.    Medical maintenance  Blood pressure is controlled in the office today. His weight is within the normal range.    Family history  His father has asthma and eczema and his mother had hip surgery in 2022. Family history of allergies, asthma, and atopic dermatitis noted. He denies any family history of diabetes, hypertension,  hyperlipidemia, heart disease, prostate cancer, anxiety, depression, kidney disease, lung disease, or COPD.     Surgical history  Surgery was performed on the left ankle.    Social history  The patient denies any smoking, smokeless tobacco, substance abuse history or illicit drug use. He drinks 4 to 6 alcoholic beverages on the weekends.     Health maintenance  In the last year he has seen his eye doctor and is due for a dental exam. He does not have a living will. Sunscreen and sunglasses are worn when he is out in the sun. He exercises regularly and lifts weights. The patient did a 10K run in [place], [place] earlier this year and has another race on 10/2023. He ran 3.5 marathons last year. The patient is self-aware of his body functions and knows when to call for help. No new angina, dyspnea, headaches, insomnia, visual changes, dizziness, palpitations, syncope, swelling in the hands or lower extremities, dyspnea when laying down, abdominal pain, constipation, hematuria, hematochezia, dysuria, heartburn, reflux, or new lumps or bumps.       Current Outpatient Medications:     albuterol (PROVENTIL HFA;VENTOLIN HFA) 108 (90 Base) MCG/ACT inhaler, Inhale 2 puffs Every 4 (Four) Hours As Needed for Wheezing or Shortness of Air., Disp: 18 g, Rfl: 3    diphenhydrAMINE (BENADRYL) 25 mg capsule, Take 1 capsule by mouth Every 6 (Six) Hours As Needed for Itching., Disp: , Rfl:     Dupilumab, Asthma, (DUPIXENT SC), Inject 2 mL under the skin into the appropriate area as directed., Disp: , Rfl:     ondansetron ODT (ZOFRAN-ODT) 4 MG disintegrating tablet, Place 1 tablet on the tongue Every 4 (Four) Hours. (Patient taking differently: Place 1 tablet on the tongue As Needed.), Disp: 12 tablet, Rfl: 0    tacrolimus (PROTOPIC) 0.1 % ointment, Apply 1 application topically to the appropriate area as directed 2 (Two) Times a Week., Disp: 30 g, Rfl: 3         Objective   Vital Signs:   /70 (BP Location: Right arm,  "Patient Position: Sitting, Cuff Size: Adult)   Pulse 62   Temp 98.7 °F (37.1 °C) (Infrared)   Resp 18   Ht 167.6 cm (66\")   Wt 58.5 kg (129 lb)   BMI 20.82 kg/m²     Physical Exam  Vitals and nursing note reviewed.   Constitutional:       General: He is not in acute distress.     Appearance: Normal appearance. He is well-developed. He is not ill-appearing.   HENT:      Head: Normocephalic and atraumatic.      Right Ear: Tympanic membrane and external ear normal.      Left Ear: Tympanic membrane and external ear normal.      Nose: Nose normal.      Mouth/Throat:      Mouth: Mucous membranes are moist.      Pharynx: Oropharynx is clear. No oropharyngeal exudate or posterior oropharyngeal erythema.   Eyes:      General: No scleral icterus.        Right eye: No discharge.         Left eye: No discharge.      Conjunctiva/sclera: Conjunctivae normal.      Pupils: Pupils are equal, round, and reactive to light.   Neck:      Thyroid: No thyromegaly.      Vascular: No carotid bruit.   Cardiovascular:      Rate and Rhythm: Normal rate and regular rhythm.      Heart sounds: Normal heart sounds. No murmur heard.    No friction rub. No gallop.   Pulmonary:      Effort: Pulmonary effort is normal.      Breath sounds: Normal breath sounds.   Abdominal:      General: Bowel sounds are normal. There is no distension.      Palpations: Abdomen is soft. There is no mass.      Tenderness: There is no abdominal tenderness. There is no guarding or rebound.      Hernia: No hernia is present.   Musculoskeletal:         General: Normal range of motion.      Cervical back: Normal range of motion and neck supple.   Lymphadenopathy:      Head:      Right side of head: No submental, submandibular, tonsillar, preauricular, posterior auricular or occipital adenopathy.      Left side of head: No submental, submandibular, tonsillar, preauricular, posterior auricular or occipital adenopathy.      Cervical: No cervical adenopathy.      Right " cervical: No superficial, deep or posterior cervical adenopathy.     Left cervical: No superficial, deep or posterior cervical adenopathy.      Upper Body:      Right upper body: No supraclavicular, axillary, pectoral or epitrochlear adenopathy.      Left upper body: No supraclavicular, axillary, pectoral or epitrochlear adenopathy.      Lower Body: No right inguinal adenopathy. No left inguinal adenopathy.   Skin:     General: Skin is warm and dry.      Capillary Refill: Capillary refill takes 2 to 3 seconds.   Neurological:      Mental Status: He is alert and oriented to person, place, and time.      Deep Tendon Reflexes: Reflexes normal.   Psychiatric:         Behavior: Behavior normal.         Thought Content: Thought content normal.         Judgment: Judgment normal.      Result Review :                 Assessment and Plan    Diagnoses and all orders for this visit:    1. Annual physical exam (Primary)    2. Atopic dermatitis, unspecified type    3. Allergic rhinitis, unspecified seasonality, unspecified trigger    Annual physical exam (Primary)  -     Discussed appropriate dental care and maintenance with patient.  -     Advised the patient to obtain a living will.  -     Encouraged to contact the office if he experiences any testicle changes such as lumps, bumps, pain or no pain, swelling, and if he is unable to find it, especially while playing sports.  -     Advised to check testicles once a month.    Atopic dermatitis, unspecified type  -     Continue current medication regimen. Continue to monitor.    History of rectal bleeding  -     Advised patient to avoid straining and prolonged standing and sitting.      BMI is within normal parameters. No other follow-up for BMI required.          Depression: PHQ-2/9 Depression Screening  Little interest or pleasure in doing things?     Feeling down, depressed, or hopeless?     PHQ-2 Total Score     PHQ-9 Total Score 0        AWV: na  A1C: No results found for:  HGBA1C   ACP: Advance Care Planning   ACP discussion was held with the patient during this visit. Patient does not have an advance directive, information provided.   Mammogram: na  Colonoscopy: na    Patient education regarding the importance of avoidance of texting while driving and the need for wearing seatbelt.  Use of sunscreen, use of helmets while on bikes.  The appropriate amounts of sleep and H20 consumption per day was reviewed with pt.  The need for healthy well-balanced diet based off the food guide pyramid and avoidance of skipping meals along with following a NCS diet with appropriate amounts of fats, protein, and carbohydrate and low sodium diet. Encouraging 30minutes of cardio 5d weekly and muscle strengthening 3x weekly.       Follow Up   Return in about 1 year (around 8/24/2024) for fasting, Annual.  Patient was given instructions and counseling regarding his condition or for health maintenance advice. Please see specific information pulled into the AVS if appropriate.     RTC/call  If symptoms worsen  Meds MOA and SE's reviewed and pt v/u    SARA Hernandez [facility]  [address] [address]  Fax [phone]  Phone [phone]

## 2023-08-24 NOTE — PATIENT INSTRUCTIONS
Advance Care Planning and Advance Directives     You make decisions on a daily basis - decisions about where you want to live, your career, your home, your life. Perhaps one of the most important decisions you face is your choice for future medical care. Take time to talk with your family and your healthcare team and start planning today.  Advance Care Planning is a process that can help you:  Understand possible future healthcare decisions in light of your own experiences  Reflect on those decision in light of your goals and values  Discuss your decisions with those closest to you and the healthcare professionals that care for you  Make a plan by creating a document that reflects your wishes    Surrogate Decision Maker  In the event of a medical emergency, which has left you unable to communicate or to make your own decisions, you would need someone to make decisions for you.  It is important to discuss your preferences for medical treatment with this person while you are in good health.     Qualities of a surrogate decision maker:  Willing to take on this role and responsibility  Knows what you want for future medical care  Willing to follow your wishes even if they don't agree with them  Able to make difficult medical decisions under stressful circumstances    Advance Directives  These are legal documents you can create that will guide your healthcare team and decision maker(s) when needed. These documents can be stored in the electronic medical record.    Living Will - a legal document to guide your care if you have a terminal condition or a serious illness and are unable to communicate. States vary by statute in document names/types, but most forms may include one or more of the following:        -  Directions regarding life-prolonging treatments        -  Directions regarding artificially provided nutrition/hydration        -  Choosing a healthcare decision maker        -  Direction regarding organ/tissue  donation    Durable Power of  for Healthcare - this document names an -in-fact to make medical decisions for you, but it may also allow this person to make personal and financial decisions for you. Please seek the advice of an  if you need this type of document.    **Advance Directives are not required and no one may discriminate against you if you do not sign one.    Medical Orders  Many states allow specific forms/orders signed by your physician to record your wishes for medical treatment in your current state of health. This form, signed in personal communication with your physician, addresses resuscitation and other medical interventions that you may or may not want.      For more information or to schedule a time with a [facility] Advance Care Planning Facilitator contact: [facility] or call [phone] and someone will contact you directly.MyPlate from USDA    MyPlate is an outline of a general healthy diet based on the Dietary Guidelines for Americans, 4845-4864, from the U.S. Department of Agriculture (USDA). It sets guidelines for how much food you should eat from each food group based on your age, sex, and level of physical activity.  What are tips for following MyPlate?  To follow MyPlate recommendations:  Eat a wide variety of fruits and vegetables, grains, and protein foods.  Serve smaller portions and eat less food throughout the day.  Limit portion sizes to avoid overeating.  Enjoy your food.  Get at least 150 minutes of exercise every week. This is about 30 minutes each day, 5 or more days per week.  It can be difficult to have every meal look like MyPlate. Think about MyPlate as eating guidelines for an entire day, rather than each individual meal.  Fruits and vegetables  Make one half of your plate fruits and vegetables.  Eat many different colors of fruits and vegetables each day.  For a 2,000-calorie daily food plan, eat:  2½ cups of vegetables every  day.  2 cups of fruit every day.  1 cup is equal to:  1 cup raw or cooked vegetables.  1 cup raw fruit.  1 medium-sized orange, apple, or banana.  1 cup 100% fruit or vegetable juice.  2 cups raw leafy greens, such as lettuce, spinach, or kale.  ½ cup dried fruit.  Grains  One fourth of your plate should be grains.  Make at least half of the grains you eat each day whole grains.  For a 2,000-calorie daily food plan, eat 6 oz of grains every day.  1 oz is equal to:  1 slice bread.  1 cup cereal.  ½ cup cooked rice, cereal, or pasta.  Protein  One fourth of your plate should be protein.  Eat a wide variety of protein foods, including meat, poultry, fish, eggs, beans, nuts, and tofu.  For a 2,000-calorie daily food plan, eat 5½ oz of protein every day.  1 oz is equal to:  1 oz meat, poultry, or fish.  ¼ cup cooked beans.  1 egg.  ½ oz nuts or seeds.  1 Tbsp peanut butter.  Dairy  Drink fat-free or low-fat (1%) milk.  Eat or drink dairy as a side to meals.  For a 2,000-calorie daily food plan, eat or drink 3 cups of dairy every day.  1 cup is equal to:  1 cup milk, yogurt, cottage cheese, or soy milk (soy beverage).  2 oz processed cheese.  1½ oz natural cheese.  Fats, oils, salt, and sugars  Only small amounts of oils are recommended.  Avoid foods that are high in calories and low in nutritional value (empty calories), like foods high in fat or added sugars.  Choose foods that are low in salt (sodium). Choose foods that have less than 140 milligrams (mg) of sodium per serving.  Drink water instead of sugary drinks. Drink enough fluid to keep your urine pale yellow.  Where to find support  Work with your health care provider or a dietitian to develop a customized eating plan that is right for you.  Download an alvaro (mobile application) to help you track your daily food intake.  Where to find more information  USDA: ChooseMyPlate.gov  Summary  MyPlate is a general guideline for healthy eating from the USDA. It is based on  the Dietary Guidelines for Americans, 1624-4007.  In general, fruits and vegetables should take up one half of your plate, grains should take up one fourth of your plate, and protein should take up one fourth of your plate.  This information is not intended to replace advice given to you by your health care provider. Make sure you discuss any questions you have with your health care provider.  Document Revised: 11/08/2021 Document Reviewed: 11/08/2021  Elsevier Patient Education © 2023 Elsevier Inc.

## 2023-09-20 ENCOUNTER — LAB (OUTPATIENT)
Dept: INTERNAL MEDICINE | Facility: CLINIC | Age: 28
End: 2023-09-20
Payer: COMMERCIAL

## 2023-09-20 DIAGNOSIS — Z13.1 ENCOUNTER FOR SCREENING EXAMINATION FOR IMPAIRED GLUCOSE REGULATION AND DIABETES MELLITUS: ICD-10-CM

## 2023-09-20 DIAGNOSIS — Z00.00 ANNUAL PHYSICAL EXAM: ICD-10-CM

## 2023-09-20 DIAGNOSIS — Z00.00 ANNUAL PHYSICAL EXAM: Primary | ICD-10-CM

## 2023-09-20 DIAGNOSIS — Z13.220 LIPID SCREENING: ICD-10-CM

## 2023-09-20 DIAGNOSIS — Z13.29 THYROID DISORDER SCREEN: ICD-10-CM

## 2023-09-20 LAB
BASOPHILS # BLD AUTO: 0.05 10*3/MM3 (ref 0–0.2)
BASOPHILS NFR BLD AUTO: 0.8 % (ref 0–1.5)
BILIRUB BLD-MCNC: NEGATIVE MG/DL
CLARITY, POC: CLEAR
COLOR UR: YELLOW
DEPRECATED RDW RBC AUTO: 38 FL (ref 37–54)
EOSINOPHIL # BLD AUTO: 1.14 10*3/MM3 (ref 0–0.4)
EOSINOPHIL NFR BLD AUTO: 17.5 % (ref 0.3–6.2)
ERYTHROCYTE [DISTWIDTH] IN BLOOD BY AUTOMATED COUNT: 11.6 % (ref 12.3–15.4)
EXPIRATION DATE: NORMAL
GLUCOSE UR STRIP-MCNC: NEGATIVE MG/DL
HCT VFR BLD AUTO: 38.8 % (ref 37.5–51)
HGB BLD-MCNC: 13.4 G/DL (ref 13–17.7)
IMM GRANULOCYTES # BLD AUTO: 0.02 10*3/MM3 (ref 0–0.05)
IMM GRANULOCYTES NFR BLD AUTO: 0.3 % (ref 0–0.5)
KETONES UR QL: NEGATIVE
LEUKOCYTE EST, POC: NEGATIVE
LYMPHOCYTES # BLD AUTO: 1.22 10*3/MM3 (ref 0.7–3.1)
LYMPHOCYTES NFR BLD AUTO: 18.8 % (ref 19.6–45.3)
Lab: NORMAL
MCH RBC QN AUTO: 31.5 PG (ref 26.6–33)
MCHC RBC AUTO-ENTMCNC: 34.5 G/DL (ref 31.5–35.7)
MCV RBC AUTO: 91.1 FL (ref 79–97)
MONOCYTES # BLD AUTO: 0.72 10*3/MM3 (ref 0.1–0.9)
MONOCYTES NFR BLD AUTO: 11.1 % (ref 5–12)
NEUTROPHILS NFR BLD AUTO: 3.35 10*3/MM3 (ref 1.7–7)
NEUTROPHILS NFR BLD AUTO: 51.5 % (ref 42.7–76)
NITRITE UR-MCNC: NEGATIVE MG/ML
NRBC BLD AUTO-RTO: 0 /100 WBC (ref 0–0.2)
PH UR: 8 [PH] (ref 5–8)
PLATELET # BLD AUTO: 198 10*3/MM3 (ref 140–450)
PMV BLD AUTO: 10.3 FL (ref 6–12)
PROT UR STRIP-MCNC: NEGATIVE MG/DL
RBC # BLD AUTO: 4.26 10*6/MM3 (ref 4.14–5.8)
RBC # UR STRIP: NEGATIVE /UL
SP GR UR: 1.01 (ref 1–1.03)
UROBILINOGEN UR QL: NORMAL
WBC NRBC COR # BLD: 6.5 10*3/MM3 (ref 3.4–10.8)

## 2023-09-20 PROCEDURE — 81003 URINALYSIS AUTO W/O SCOPE: CPT | Performed by: NURSE PRACTITIONER

## 2023-09-20 PROCEDURE — 80050 GENERAL HEALTH PANEL: CPT | Performed by: NURSE PRACTITIONER

## 2023-09-20 PROCEDURE — 36415 COLL VENOUS BLD VENIPUNCTURE: CPT | Performed by: NURSE PRACTITIONER

## 2023-09-20 PROCEDURE — 80061 LIPID PANEL: CPT | Performed by: NURSE PRACTITIONER

## 2023-09-21 LAB
ALBUMIN SERPL-MCNC: 4.5 G/DL (ref 3.5–5.2)
ALBUMIN/GLOB SERPL: 2 G/DL
ALP SERPL-CCNC: 73 U/L (ref 39–117)
ALT SERPL W P-5'-P-CCNC: 19 U/L (ref 1–41)
ANION GAP SERPL CALCULATED.3IONS-SCNC: 10 MMOL/L (ref 5–15)
AST SERPL-CCNC: 23 U/L (ref 1–40)
BILIRUB SERPL-MCNC: 0.2 MG/DL (ref 0–1.2)
BUN SERPL-MCNC: 14 MG/DL (ref 6–20)
BUN/CREAT SERPL: 10.9 (ref 7–25)
CALCIUM SPEC-SCNC: 9.5 MG/DL (ref 8.6–10.5)
CHLORIDE SERPL-SCNC: 104 MMOL/L (ref 98–107)
CHOLEST SERPL-MCNC: 196 MG/DL (ref 0–200)
CO2 SERPL-SCNC: 27 MMOL/L (ref 22–29)
CREAT SERPL-MCNC: 1.29 MG/DL (ref 0.76–1.27)
EGFRCR SERPLBLD CKD-EPI 2021: 77.5 ML/MIN/1.73
GLOBULIN UR ELPH-MCNC: 2.2 GM/DL
GLUCOSE SERPL-MCNC: 112 MG/DL (ref 65–99)
HDLC SERPL-MCNC: 75 MG/DL (ref 40–60)
LDLC SERPL CALC-MCNC: 104 MG/DL (ref 0–100)
LDLC/HDLC SERPL: 1.36 {RATIO}
POTASSIUM SERPL-SCNC: 4.5 MMOL/L (ref 3.5–5.2)
PROT SERPL-MCNC: 6.7 G/DL (ref 6–8.5)
SODIUM SERPL-SCNC: 141 MMOL/L (ref 136–145)
TRIGL SERPL-MCNC: 96 MG/DL (ref 0–150)
TSH SERPL DL<=0.05 MIU/L-ACNC: 0.57 UIU/ML (ref 0.27–4.2)
VLDLC SERPL-MCNC: 17 MG/DL (ref 5–40)

## 2023-09-26 DIAGNOSIS — D72.10 EOSINOPHILIA, UNSPECIFIED TYPE: Primary | ICD-10-CM

## 2023-09-29 ENCOUNTER — TELEPHONE (OUTPATIENT)
Dept: INTERNAL MEDICINE | Facility: CLINIC | Age: 28
End: 2023-09-29
Payer: COMMERCIAL

## 2023-09-29 NOTE — TELEPHONE ENCOUNTER
Patient sent in [employer] message on 9/20/2023 regarding paperwork that he needed completed by the end of September. He has not received the paperwork or a response as of yet. Please advise.

## 2023-12-19 ENCOUNTER — SPECIALTY PHARMACY (OUTPATIENT)
Dept: PHARMACY | Facility: TELEHEALTH | Age: 28
End: 2023-12-19
Payer: COMMERCIAL

## 2024-05-01 ENCOUNTER — OFFICE VISIT (OUTPATIENT)
Dept: INTERNAL MEDICINE | Facility: CLINIC | Age: 29
End: 2024-05-01
Payer: COMMERCIAL

## 2024-05-01 VITALS
BODY MASS INDEX: 20.89 KG/M2 | DIASTOLIC BLOOD PRESSURE: 82 MMHG | TEMPERATURE: 98 F | SYSTOLIC BLOOD PRESSURE: 122 MMHG | HEIGHT: 66 IN | HEART RATE: 76 BPM | RESPIRATION RATE: 18 BRPM | WEIGHT: 130 LBS

## 2024-05-01 DIAGNOSIS — J45.20 INTERMITTENT ASTHMA WITHOUT COMPLICATION, UNSPECIFIED ASTHMA SEVERITY: ICD-10-CM

## 2024-05-01 DIAGNOSIS — F41.8 SITUATIONAL ANXIETY: ICD-10-CM

## 2024-05-01 DIAGNOSIS — T78.40XA ALLERGY, INITIAL ENCOUNTER: Primary | ICD-10-CM

## 2024-05-01 DIAGNOSIS — H61.23 BILATERAL IMPACTED CERUMEN: ICD-10-CM

## 2024-05-01 PROCEDURE — 99214 OFFICE O/P EST MOD 30 MIN: CPT | Performed by: NURSE PRACTITIONER

## 2024-05-01 RX ORDER — HYDROXYZINE HYDROCHLORIDE 10 MG/1
10 TABLET, FILM COATED ORAL DAILY PRN
Qty: 60 TABLET | Refills: 0 | Status: SHIPPED | OUTPATIENT
Start: 2024-05-01

## 2024-05-01 RX ORDER — MONTELUKAST SODIUM 10 MG/1
10 TABLET ORAL NIGHTLY
Qty: 30 TABLET | Refills: 5 | Status: SHIPPED | OUTPATIENT
Start: 2024-05-01

## 2024-05-01 RX ORDER — FLUTICASONE PROPIONATE AND SALMETEROL XINAFOATE 115; 21 UG/1; UG/1
2 AEROSOL, METERED RESPIRATORY (INHALATION)
Qty: 12 G | Refills: 11 | Status: SHIPPED | OUTPATIENT
Start: 2024-05-01

## 2024-05-01 RX ORDER — ALBUTEROL SULFATE 90 UG/1
2 AEROSOL, METERED RESPIRATORY (INHALATION) EVERY 4 HOURS PRN
Qty: 18 G | Refills: 3 | Status: SHIPPED | OUTPATIENT
Start: 2024-05-01

## 2024-05-01 RX ORDER — LEVOCETIRIZINE DIHYDROCHLORIDE 5 MG/1
5 TABLET, FILM COATED ORAL EVERY EVENING
Qty: 90 TABLET | Refills: 3 | Status: SHIPPED | OUTPATIENT
Start: 2024-05-01

## 2024-05-01 RX ORDER — AZELASTINE HYDROCHLORIDE, FLUTICASONE PROPIONATE 137; 50 UG/1; UG/1
1 SPRAY, METERED NASAL DAILY
Qty: 23 G | Refills: 11 | Status: SHIPPED | OUTPATIENT
Start: 2024-05-01

## 2024-05-01 NOTE — PROGRESS NOTES
"Chief Complaint  Asthma (Flare ups. ) and Anxiety (Specifically on planes. )    Subjective          Darnell Ganrett presents to [facility]  Asthma  His past medical history is significant for asthma.   Anxiety  His past medical history is significant for asthma.     History of Present Illness    Patient comes to clinic today for recurrent asthma and allergy symptoms.  He was treated with allergy medications in the past but was on Dupixent which was controlling his asthma allergy medications.  He was taking his medication due to his eczema.  Recently medications were changed and he is on Adbry however his asthma has exacerbated and would like to get back on his medication.    He recently got engaged and has new concerns now regarding anxiety with flights.  He would like to try something but not too strong.  He has tried Benadryl but it made him too drowsy.    Objective   Vital Signs:   /82 (BP Location: Left arm, Patient Position: Sitting, Cuff Size: Adult)   Pulse 76   Temp 98 °F (36.7 °C) (Infrared)   Resp 18   Ht 167.6 cm (66\")   Wt 59 kg (130 lb)   BMI 20.98 kg/m²     Physical Exam  Vitals and nursing note reviewed.   Constitutional:       General: He is not in acute distress.     Appearance: Normal appearance. He is well-developed. He is not ill-appearing.   HENT:      Right Ear: There is impacted cerumen.      Left Ear: There is impacted cerumen.      Nose: No congestion.      Mouth/Throat:      Pharynx: No oropharyngeal exudate or posterior oropharyngeal erythema.      Comments: Clear postnasal drainage  Eyes:      General: No scleral icterus.  Neck:      Thyroid: No thyromegaly.   Cardiovascular:      Rate and Rhythm: Normal rate and regular rhythm.   Pulmonary:      Effort: Pulmonary effort is normal.      Breath sounds: Normal breath sounds.   Abdominal:      General: Bowel sounds are normal. There is no distension.      Palpations: " Abdomen is soft.      Tenderness: There is no abdominal tenderness.   Lymphadenopathy:      Cervical: No cervical adenopathy.   Skin:     Capillary Refill: Capillary refill takes 2 to 3 seconds.      Coloration: Skin is not pale.   Neurological:      Mental Status: He is alert and oriented to person, place, and time.   Psychiatric:         Mood and Affect: Mood normal.         Behavior: Behavior normal.        Result Review :                 Assessment and Plan    Diagnoses and all orders for this visit:    1. Allergy, initial encounter (Primary)  -     Azelastine-Fluticasone 137-50 MCG/ACT suspension; 1 spray into the nostril(s) as directed by provider Daily.  Dispense: 23 g; Refill: 11  -     montelukast (Singulair) 10 MG tablet; Take 1 tablet by mouth Every Night.  Dispense: 30 tablet; Refill: 5  -     levocetirizine (XYZAL) 5 MG tablet; Take 1 tablet by mouth Every Evening.  Dispense: 90 tablet; Refill: 3  -     fluticasone-salmeterol (Advair HFA) 115-21 MCG/ACT inhaler; Inhale 2 puffs 2 (Two) Times a Day.  Dispense: 12 g; Refill: 11  -     albuterol sulfate  (90 Base) MCG/ACT inhaler; Inhale 2 puffs Every 4 (Four) Hours As Needed for Wheezing or Shortness of Air.  Dispense: 18 g; Refill: 3    2. Intermittent asthma without complication, unspecified asthma severity  -     Azelastine-Fluticasone 137-50 MCG/ACT suspension; 1 spray into the nostril(s) as directed by provider Daily.  Dispense: 23 g; Refill: 11  -     montelukast (Singulair) 10 MG tablet; Take 1 tablet by mouth Every Night.  Dispense: 30 tablet; Refill: 5  -     levocetirizine (XYZAL) 5 MG tablet; Take 1 tablet by mouth Every Evening.  Dispense: 90 tablet; Refill: 3  -     fluticasone-salmeterol (Advair HFA) 115-21 MCG/ACT inhaler; Inhale 2 puffs 2 (Two) Times a Day.  Dispense: 12 g; Refill: 11  -     albuterol sulfate  (90 Base) MCG/ACT inhaler; Inhale 2 puffs Every 4 (Four) Hours As Needed for Wheezing or Shortness of Air.  Dispense:  18 g; Refill: 3    3. Situational anxiety  -     Cancel: ToxAssure Flex 23, Ur -; Future    4. Bilateral impacted cerumen    Other orders  -     hydrOXYzine (ATARAX) 10 MG tablet; Take 1 tablet by mouth Daily As Needed for Anxiety (1-2 preflight).  Dispense: 60 tablet; Refill: 0    We discussed Xanax as well as hydroxyzine he would like to take hydroxyzine at a lower dose than what he had in the past as this is also made him drowsy.  He is agreeable to try 10 mg 1 to 2 tablets preflight and let me know how he does if this is not acceptable we talked about starting Xanax preflight if needed.  Assessment & Plan    Unable to remove impaction with irrigation-Debrox as instructed return in 2 weeks for recheck and irrigation if needed.      BMI is within normal parameters. No other follow-up for BMI required.    Follow Up   Return for 8/25/24 after, Annual, fasting.  Patient was given instructions and counseling regarding his condition or for health maintenance advice. Please see specific information pulled into the AVS if appropriate.     RTC/call  If symptoms worsen  Meds MOA and SE's reviewed and pt v/u    Transcribed from ambient dictation for SARA Hernandez by SARA Hernandez.  05/01/24   15:51 EDT    Patient or patient representative verbalized consent to the visit recording.  I have personally performed the services described in this document as transcribed by the above individual, and it is both accurate and complete.

## 2024-06-05 ENCOUNTER — PATIENT MESSAGE (OUTPATIENT)
Dept: INTERNAL MEDICINE | Facility: CLINIC | Age: 29
End: 2024-06-05
Payer: COMMERCIAL

## 2024-06-05 ENCOUNTER — LAB (OUTPATIENT)
Dept: INTERNAL MEDICINE | Facility: CLINIC | Age: 29
End: 2024-06-05
Payer: COMMERCIAL

## 2024-06-05 DIAGNOSIS — Z51.81 MEDICATION MONITORING ENCOUNTER: Primary | ICD-10-CM

## 2024-06-05 DIAGNOSIS — F41.8 SITUATIONAL ANXIETY: Primary | ICD-10-CM

## 2024-06-05 RX ORDER — ALPRAZOLAM 0.25 MG/1
TABLET ORAL
Qty: 8 TABLET | Refills: 0 | Status: SHIPPED | OUTPATIENT
Start: 2024-06-05

## 2024-06-06 NOTE — TELEPHONE ENCOUNTER
From: Darnell Garnett  To: Nuris Alvarez  Sent: 6/5/2024 3:32 PM EDT  Subject: Flight Anxiety Meds    Hi Dr. Alvarez,     I came by today (6/5/24) to perform my urine drug screen for the controlled substance medication that you had originally suggested in my previous appointment. I tried the Hydroxyzine that was a potential alternative, but still had considerable anxiety during a recent flight. I also filled out the controlled substance agreement while I was in the doctors office. Please let me know if I need to do anything further.     Thank you,   Morgan Garnett

## 2024-06-10 LAB
1OH-MIDAZOLAM UR QL SCN: NOT DETECTED NG/MG CREAT
6MAM UR QL SCN: NEGATIVE NG/ML
7AMINOCLONAZEPAM/CREAT UR: NOT DETECTED NG/MG CREAT
A-OH ALPRAZ/CREAT UR: NOT DETECTED NG/MG CREAT
A-OH-TRIAZOLAM/CREAT UR CFM: NOT DETECTED NG/MG CREAT
ACP UR QL CFM: NOT DETECTED
ALPRAZ/CREAT UR CFM: NOT DETECTED NG/MG CREAT
AMPHETAMINES UR QL SCN: NEGATIVE NG/ML
APAP UR QL SCN: NEGATIVE UG/ML
BARBITURATES UR QL SCN: NEGATIVE NG/ML
BENZODIAZ SCN METH UR: NEGATIVE
BUPRENORPHINE UR QL SCN: NEGATIVE
BUPRENORPHINE/CREAT UR: NOT DETECTED NG/MG CREAT
CANNABINOIDS UR QL SCN: NEGATIVE NG/ML
CARISOPRODOL UR QL: NEGATIVE NG/ML
CLONAZEPAM/CREAT UR CFM: NOT DETECTED NG/MG CREAT
COCAINE+BZE UR QL SCN: NEGATIVE NG/ML
CREAT UR-MCNC: 58 MG/DL
D-METHORPHAN UR-MCNC: NOT DETECTED NG/ML
D-METHORPHAN+LEVORPHANOL UR QL: NOT DETECTED
DESALKYLFLURAZ/CREAT UR: NOT DETECTED NG/MG CREAT
DIAZEPAM/CREAT UR: NOT DETECTED NG/MG CREAT
ETG ETS UR QL CFM: NORMAL
ETHANOL UR QL SCN: NEGATIVE G/DL
ETHANOL UR QL SCN: NORMAL NG/ML
ETHYL GLUCURONIDE UR CFM-MCNC: 6986 NG/MG CREAT
ETHYL SULFATE UR CFM-MCNC: 3633 NG/MG CREAT
FENTANYL CTO UR SCN-MCNC: NEGATIVE NG/ML
FENTANYL/CREAT UR: NOT DETECTED NG/MG CREAT
FLUNITRAZEPAM UR QL SCN: NOT DETECTED NG/MG CREAT
GABAPENTIN UR-MCNC: NEGATIVE UG/ML
HYPNOTICS UR QL SCN: NEGATIVE
KETAMINE UR QL: NOT DETECTED
LORAZEPAM/CREAT UR: NOT DETECTED NG/MG CREAT
MEPERIDINE UR QL SCN: NEGATIVE NG/ML
METHADONE UR QL SCN: NEGATIVE NG/ML
METHADONE+METAB UR QL SCN: NEGATIVE NG/ML
MIDAZOLAM/CREAT UR CFM: NOT DETECTED NG/MG CREAT
MISCELLANEOUS, UR: NEGATIVE
NORBUPRENORPHINE/CREAT UR: NOT DETECTED NG/MG CREAT
NORDIAZEPAM/CREAT UR: NOT DETECTED NG/MG CREAT
NORFENTANYL/CREAT UR: NOT DETECTED NG/MG CREAT
NORFLUNITRAZEPAM UR-MCNC: NOT DETECTED NG/MG CREAT
NORKETAMINE UR-MCNC: NOT DETECTED UG/ML
OPIATES UR SCN-MCNC: NEGATIVE NG/ML
OTHER HALLUCINOGENS UR: NEGATIVE
OXAZEPAM/CREAT UR: NOT DETECTED NG/MG CREAT
OXYCODONE CTO UR SCN-MCNC: NEGATIVE NG/ML
PCP UR QL SCN: NEGATIVE NG/ML
PRESCRIBED MEDICATIONS: NORMAL
PROPOXYPH UR QL SCN: NEGATIVE NG/ML
TAPENTADOL CTO UR SCN-MCNC: NEGATIVE NG/ML
TEMAZEPAM/CREAT UR: NOT DETECTED NG/MG CREAT
TRAMADOL UR QL SCN: NEGATIVE NG/ML
ZALEPLON UR-MCNC: NOT DETECTED NG/ML
ZOLPIDEM PHENYL-4-CARB UR QL SCN: NOT DETECTED
ZOLPIDEM UR QL SCN: NOT DETECTED
ZOPICLONE-N-OXIDE UR-MCNC: NOT DETECTED NG/ML

## 2024-06-13 ENCOUNTER — PATIENT MESSAGE (OUTPATIENT)
Dept: INTERNAL MEDICINE | Facility: CLINIC | Age: 29
End: 2024-06-13
Payer: COMMERCIAL

## 2024-06-13 DIAGNOSIS — R41.840 ATTENTION DEFICIT: Primary | ICD-10-CM

## 2024-06-14 DIAGNOSIS — R41.840 ATTENTION DEFICIT: Primary | ICD-10-CM

## 2024-06-14 NOTE — TELEPHONE ENCOUNTER
From: Darnell Garnett  To: Nuris Alvarez  Sent: 6/13/2024 10:16 AM EDT  Subject: Difficulty concentrating    Hi Dr. Alvarez,     I have recently had a significant increase in work load/job responsibilities. I have been having more & more trouble concentrating at work and find myself getting distracted in an unproductive way. I work in a lab/program machines, so mistakes I've been making recently due to a lack of concentration have been detrimental. I have previously been prescribed adhd type medications as an adult right after college and I was wondering if you had any suggestions? Please let me know if you'd like to discuss further in person or potentially schedule a virtual appointment or is this is something out of your normal scope.    ThanksMorgan

## 2024-07-12 ENCOUNTER — OFFICE VISIT (OUTPATIENT)
Dept: INTERNAL MEDICINE | Facility: CLINIC | Age: 29
End: 2024-07-12
Payer: COMMERCIAL

## 2024-07-12 VITALS
DIASTOLIC BLOOD PRESSURE: 70 MMHG | BODY MASS INDEX: 20.89 KG/M2 | HEART RATE: 72 BPM | WEIGHT: 130 LBS | RESPIRATION RATE: 18 BRPM | TEMPERATURE: 97.7 F | HEIGHT: 66 IN | SYSTOLIC BLOOD PRESSURE: 110 MMHG

## 2024-07-12 DIAGNOSIS — H61.21 HEARING LOSS DUE TO CERUMEN IMPACTION, RIGHT: ICD-10-CM

## 2024-07-12 DIAGNOSIS — H60.331 ACUTE SWIMMER'S EAR OF RIGHT SIDE: Primary | ICD-10-CM

## 2024-07-12 PROCEDURE — 99213 OFFICE O/P EST LOW 20 MIN: CPT | Performed by: NURSE PRACTITIONER

## 2024-07-12 NOTE — PROGRESS NOTES
Patient Name: Darnell Garnett  : 1995   MRN: [id number]     Chief Complaint:    Chief Complaint   Patient presents with    Ear Fullness     Right ear.        History of Present Illness: Darnell Garnett is a 29 y.o. male.  History of Present Illness  The patient presents for evaluation of hearing loss.    He began experiencing hearing difficulties on 2024 after swimming in a pool. He reports no ear pain, but describes a sensation of his ear being blocked and a sensation of wax dislodgement. He has been self-medicating with wax softening drops for approximately 4 days. He denies experiencing any fevers or chills.         Subjective     Review of System: Review of Systems     Medications:     Current Outpatient Medications:     albuterol sulfate  (90 Base) MCG/ACT inhaler, Inhale 2 puffs Every 4 (Four) Hours As Needed for Wheezing or Shortness of Air., Disp: 18 g, Rfl: 3    ALPRAZolam (Xanax) 0.25 MG tablet, 1 tablet preflight for situational anxiety., Disp: 8 tablet, Rfl: 0    Azelastine-Fluticasone 137-50 MCG/ACT suspension, 1 spray into the nostril(s) as directed by provider Daily., Disp: 23 g, Rfl: 11    diphenhydrAMINE (BENADRYL) 25 mg capsule, Take 1 capsule by mouth Every 6 (Six) Hours As Needed for Itching., Disp: , Rfl:     fluticasone-salmeterol (Advair HFA) 115-21 MCG/ACT inhaler, Inhale 2 puffs 2 (Two) Times a Day., Disp: 12 g, Rfl: 11    hydrOXYzine (ATARAX) 10 MG tablet, Take 1 tablet by mouth Daily As Needed for Anxiety (1-2 preflight)., Disp: 60 tablet, Rfl: 0    levocetirizine (XYZAL) 5 MG tablet, Take 1 tablet by mouth Every Evening., Disp: 90 tablet, Rfl: 3    montelukast (Singulair) 10 MG tablet, Take 1 tablet by mouth Every Night., Disp: 30 tablet, Rfl: 5    ondansetron ODT (ZOFRAN-ODT) 4 MG disintegrating tablet, Place 1 tablet on the tongue Every 4 (Four) Hours. (Patient taking differently: Place 1 tablet on the tongue As Needed.), Disp: 12 tablet, Rfl: 0    " tacrolimus (PROTOPIC) 0.1 % ointment, Apply 1 application topically to the appropriate area as directed 2 (Two) Times a Week., Disp: 30 g, Rfl: 3    Tralokinumab-ldrm (Adbry) 150 MG/ML solution prefilled syringe, Inject 300 mg under the skin into the appropriate area as directed Every 14 (Fourteen) Days., Disp: 4 mL, Rfl: 2    Tralokinumab-ldrm (Adbry) 150 MG/ML solution prefilled syringe, Inject 600mg under the skin into the appropriate area as directed on day 1, then starting on day 15 inject 300mg under the skin every other week, Disp: 4 mL, Rfl: 0    ofloxacin (FLOXIN) 0.3 % otic solution, Administer 5 drops to the right ear Daily., Disp: 5 mL, Rfl: 0    Allergies:   No Known Allergies    Objective     Physical Exam:   Vital Signs:   Vitals:    07/12/24 1133   BP: 110/70   BP Location: Right arm   Patient Position: Sitting   Cuff Size: Adult   Pulse: 72   Resp: 18   Temp: 97.7 °F (36.5 °C)   TempSrc: Infrared   Weight: 59 kg (130 lb)   Height: 167.6 cm (66\")   PainSc: 0-No pain     Body mass index is 20.98 kg/m². BMI is within normal parameters. No other follow-up for BMI required.      Physical Exam  Constitutional:       Appearance: He is not ill-appearing.   HENT:      Right Ear: There is impacted cerumen.      Left Ear: There is impacted cerumen.      Nose: Congestion present. No rhinorrhea.   Cardiovascular:      Rate and Rhythm: Normal rate.      Heart sounds: Normal heart sounds.   Pulmonary:      Breath sounds: Normal breath sounds.       Physical Exam       Ear Cerumen Removal    Date/Time: 7/14/2024 6:19 PM    Performed by: Mae Vences APRN  Authorized by: Mae Vences APRN  Consent: Verbal consent obtained.  Risks and benefits: risks, benefits and alternatives were discussed  Location details: right ear  Patient tolerance: patient tolerated the procedure well with no immediate complications  Procedure type: instrumentation, irrigation         Results         Assessment / Plan  "     Assessment/Plan:   Diagnoses and all orders for this visit:    1. Acute swimmer's ear of right side (Primary)  -     ofloxacin (FLOXIN) 0.3 % otic solution; Administer 5 drops to the right ear Daily.  Dispense: 5 mL; Refill: 0    2. Hearing loss due to cerumen impaction, right  -     Ear Cerumen Removal       Assessment & Plan  1. Hearing loss.    Cerumen impaction of right ear successfully removed; patient tolerated well.  Ofloxacin drops prescribed to cover for infection.  He will utilize Debrox on the left ear and return to clinic if no improvement in symptoms.        Patient or patient representative verbalized consent for the use of Ambient Listening during the visit with  SARA Villagomez for chart documentation. 7/14/2024  18:21 EDT    SARA Villagoemz  [facility]

## 2024-07-14 PROCEDURE — 69210 REMOVE IMPACTED EAR WAX UNI: CPT | Performed by: NURSE PRACTITIONER

## 2024-07-14 RX ORDER — OFLOXACIN 3 MG/ML
5 SOLUTION AURICULAR (OTIC) DAILY
Qty: 5 ML | Refills: 0 | Status: SHIPPED | OUTPATIENT
Start: 2024-07-14

## 2025-05-10 DIAGNOSIS — T78.40XA ALLERGY, INITIAL ENCOUNTER: ICD-10-CM

## 2025-05-10 DIAGNOSIS — J45.20 INTERMITTENT ASTHMA WITHOUT COMPLICATION, UNSPECIFIED ASTHMA SEVERITY: ICD-10-CM

## 2025-05-12 RX ORDER — FLUTICASONE PROPIONATE AND SALMETEROL XINAFOATE 115; 21 UG/1; UG/1
2 AEROSOL, METERED RESPIRATORY (INHALATION) 2 TIMES DAILY
Qty: 12 G | Refills: 1 | Status: SHIPPED | OUTPATIENT
Start: 2025-05-12

## 2025-05-12 RX ORDER — ALBUTEROL SULFATE 90 UG/1
2 INHALANT RESPIRATORY (INHALATION) EVERY 4 HOURS PRN
Qty: 8.5 G | Refills: 0 | Status: SHIPPED | OUTPATIENT
Start: 2025-05-12

## (undated) DEVICE — PK EXTREM LOWR 10

## (undated) DEVICE — ANTIBACTERIAL UNDYED BRAIDED (POLYGLACTIN 910), SYNTHETIC ABSORBABLE SUTURE: Brand: COATED VICRYL

## (undated) DEVICE — Device

## (undated) DEVICE — DRSNG GZ CURAD XEROFORM NONADHR OVERWRAP 5X9IN

## (undated) DEVICE — STERILE PVP: Brand: MEDLINE INDUSTRIES, INC.

## (undated) DEVICE — GOWN,REINFORCE,POLY,SIRUS,BREATH SLV,XLG: Brand: MEDLINE

## (undated) DEVICE — UNDERCAST PADDING: Brand: DEROYAL

## (undated) DEVICE — SPNG GZ WOVN 4X4IN 12PLY 10/BX STRL

## (undated) DEVICE — TRAP FLD MINIVAC MEGADYNE 100ML

## (undated) DEVICE — TBG PENCL TELESCP MEGADYNE SMOKE EVAC 10FT

## (undated) DEVICE — SNAP KOVER: Brand: UNBRANDED

## (undated) DEVICE — BIT DRL 3.0MM

## (undated) DEVICE — PATIENT RETURN ELECTRODE, SINGLE-USE, CONTACT QUALITY MONITORING, ADULT, WITH 9FT CORD, FOR PATIENTS WEIGING OVER 33LBS. (15KG): Brand: MEGADYNE

## (undated) DEVICE — GLV SURG PREMIERPRO MIC LTX PF SZ8 BRN

## (undated) DEVICE — BIT DRL 2.5X110MM

## (undated) DEVICE — KT PUMP INFUBLOCK MDL 2100 PMKITSOLIS